# Patient Record
Sex: MALE | Race: WHITE | NOT HISPANIC OR LATINO | URBAN - METROPOLITAN AREA
[De-identification: names, ages, dates, MRNs, and addresses within clinical notes are randomized per-mention and may not be internally consistent; named-entity substitution may affect disease eponyms.]

---

## 2017-08-14 ENCOUNTER — EMERGENCY (EMERGENCY)
Facility: HOSPITAL | Age: 47
LOS: 1 days | Discharge: HOME | End: 2017-08-14
Attending: EMERGENCY MEDICINE | Admitting: EMERGENCY MEDICINE
Payer: COMMERCIAL

## 2017-08-14 VITALS
HEIGHT: 69 IN | HEART RATE: 99 BPM | TEMPERATURE: 98 F | RESPIRATION RATE: 16 BRPM | SYSTOLIC BLOOD PRESSURE: 164 MMHG | WEIGHT: 199.96 LBS | DIASTOLIC BLOOD PRESSURE: 90 MMHG | OXYGEN SATURATION: 98 %

## 2017-08-14 DIAGNOSIS — Z88.1 ALLERGY STATUS TO OTHER ANTIBIOTIC AGENTS STATUS: ICD-10-CM

## 2017-08-14 DIAGNOSIS — Z90.49 ACQUIRED ABSENCE OF OTHER SPECIFIED PARTS OF DIGESTIVE TRACT: Chronic | ICD-10-CM

## 2017-08-14 DIAGNOSIS — K21.9 GASTRO-ESOPHAGEAL REFLUX DISEASE WITHOUT ESOPHAGITIS: ICD-10-CM
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
## 2017-08-14 DIAGNOSIS — R51 HEADACHE: ICD-10-CM
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
## 2017-08-14 DIAGNOSIS — S02.2XXA FRACTURE OF NASAL BONES, INITIAL ENCOUNTER FOR CLOSED FRACTURE: ICD-10-CM

## 2017-08-14 DIAGNOSIS — I10 ESSENTIAL (PRIMARY) HYPERTENSION: ICD-10-CM

## 2017-08-14 PROCEDURE — 99285 EMERGENCY DEPT VISIT HI MDM: CPT | Mod: 25

## 2017-08-14 PROCEDURE — 70450 CT HEAD/BRAIN W/O DYE: CPT | Mod: 26

## 2017-08-14 PROCEDURE — 70450 CT HEAD/BRAIN W/O DYE: CPT

## 2017-08-14 PROCEDURE — 99284 EMERGENCY DEPT VISIT MOD MDM: CPT

## 2017-08-14 PROCEDURE — 72125 CT NECK SPINE W/O DYE: CPT

## 2017-08-14 PROCEDURE — 72125 CT NECK SPINE W/O DYE: CPT | Mod: 26

## 2017-08-14 PROCEDURE — 70486 CT MAXILLOFACIAL W/O DYE: CPT

## 2017-08-14 PROCEDURE — 21337 CLOSED TX SEPTAL&NOSE FX: CPT

## 2017-08-14 PROCEDURE — 70486 CT MAXILLOFACIAL W/O DYE: CPT | Mod: 26

## 2017-08-14 RX ORDER — AMLODIPINE BESYLATE 2.5 MG/1
0 TABLET ORAL
Qty: 0 | Refills: 0 | COMMUNITY

## 2017-08-14 RX ORDER — OXYCODONE AND ACETAMINOPHEN 5; 325 MG/1; MG/1
1 TABLET ORAL ONCE
Qty: 0 | Refills: 0 | Status: DISCONTINUED | OUTPATIENT
Start: 2017-08-14 | End: 2017-08-14

## 2017-08-14 RX ORDER — IBUPROFEN 200 MG
600 TABLET ORAL ONCE
Qty: 0 | Refills: 0 | Status: COMPLETED | OUTPATIENT
Start: 2017-08-14 | End: 2017-08-14

## 2017-08-14 RX ADMIN — Medication 600 MILLIGRAM(S): at 11:30

## 2017-08-14 RX ADMIN — OXYCODONE AND ACETAMINOPHEN 1 TABLET(S): 5; 325 TABLET ORAL at 15:10

## 2017-08-14 RX ADMIN — Medication 1 TABLET(S): at 15:10
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
## 2017-08-14 NOTE — ED PROVIDER NOTE - CARE PLAN
Principal Discharge DX:	Facial injury, initial encounter Principal Discharge DX:	Injury of head, initial encounter  Secondary Diagnosis:	Closed fracture of nasal bone, initial encounter

## 2017-08-16 ENCOUNTER — EMERGENCY (EMERGENCY)
Facility: HOSPITAL | Age: 47
LOS: 1 days | Discharge: HOME | End: 2017-08-16
Attending: EMERGENCY MEDICINE | Admitting: EMERGENCY MEDICINE
Payer: COMMERCIAL

## 2017-08-16 VITALS
HEART RATE: 128 BPM | DIASTOLIC BLOOD PRESSURE: 90 MMHG | SYSTOLIC BLOOD PRESSURE: 147 MMHG | WEIGHT: 198.42 LBS | RESPIRATION RATE: 19 BRPM | HEIGHT: 69 IN | TEMPERATURE: 99 F | OXYGEN SATURATION: 96 %

## 2017-08-16 DIAGNOSIS — Z90.49 ACQUIRED ABSENCE OF OTHER SPECIFIED PARTS OF DIGESTIVE TRACT: Chronic | ICD-10-CM

## 2017-08-16 DIAGNOSIS — Z88.1 ALLERGY STATUS TO OTHER ANTIBIOTIC AGENTS STATUS: ICD-10-CM

## 2017-08-16 DIAGNOSIS — Z79.2 LONG TERM (CURRENT) USE OF ANTIBIOTICS: ICD-10-CM

## 2017-08-16 DIAGNOSIS — R50.9 FEVER, UNSPECIFIED: ICD-10-CM

## 2017-08-16 DIAGNOSIS — K21.9 GASTRO-ESOPHAGEAL REFLUX DISEASE WITHOUT ESOPHAGITIS: ICD-10-CM

## 2017-08-16 DIAGNOSIS — Z79.891 LONG TERM (CURRENT) USE OF OPIATE ANALGESIC: ICD-10-CM

## 2017-08-16 DIAGNOSIS — I10 ESSENTIAL (PRIMARY) HYPERTENSION: ICD-10-CM

## 2017-08-16 DIAGNOSIS — J01.01 ACUTE RECURRENT MAXILLARY SINUSITIS: ICD-10-CM

## 2017-08-16 LAB
ALBUMIN SERPL ELPH-MCNC: 4.1 G/DL — SIGNIFICANT CHANGE UP (ref 3.3–5)
ALP SERPL-CCNC: 62 U/L — SIGNIFICANT CHANGE UP (ref 40–120)
ALT FLD-CCNC: 69 U/L — SIGNIFICANT CHANGE UP (ref 12–78)
ANION GAP SERPL CALC-SCNC: 8 MMOL/L — SIGNIFICANT CHANGE UP (ref 5–17)
AST SERPL-CCNC: 49 U/L — HIGH (ref 15–37)
BASOPHILS # BLD AUTO: 0 K/UL — SIGNIFICANT CHANGE UP (ref 0–0.2)
BASOPHILS NFR BLD AUTO: 0.3 % — SIGNIFICANT CHANGE UP (ref 0–2)
BILIRUB SERPL-MCNC: 1 MG/DL — SIGNIFICANT CHANGE UP (ref 0.2–1.2)
BUN SERPL-MCNC: 13 MG/DL — SIGNIFICANT CHANGE UP (ref 7–23)
CALCIUM SERPL-MCNC: 9.6 MG/DL — SIGNIFICANT CHANGE UP (ref 8.5–10.1)
CHLORIDE SERPL-SCNC: 101 MMOL/L — SIGNIFICANT CHANGE UP (ref 96–108)
CO2 SERPL-SCNC: 27 MMOL/L — SIGNIFICANT CHANGE UP (ref 22–31)
CREAT SERPL-MCNC: 1 MG/DL — SIGNIFICANT CHANGE UP (ref 0.5–1.3)
EOSINOPHIL # BLD AUTO: 0 K/UL — SIGNIFICANT CHANGE UP (ref 0–0.5)
EOSINOPHIL NFR BLD AUTO: 0.1 % — SIGNIFICANT CHANGE UP (ref 0–6)
GLUCOSE SERPL-MCNC: 123 MG/DL — HIGH (ref 70–99)
HCT VFR BLD CALC: 47.4 % — SIGNIFICANT CHANGE UP (ref 39–50)
HGB BLD-MCNC: 15.6 G/DL — SIGNIFICANT CHANGE UP (ref 13–17)
LACTATE SERPL-SCNC: 1.1 MMOL/L — SIGNIFICANT CHANGE UP (ref 0.7–2)
LYMPHOCYTES # BLD AUTO: 0.7 K/UL — LOW (ref 1–3.3)
LYMPHOCYTES # BLD AUTO: 5.2 % — LOW (ref 13–44)
MCHC RBC-ENTMCNC: 31.6 PG — SIGNIFICANT CHANGE UP (ref 27–34)
MCHC RBC-ENTMCNC: 33 GM/DL — SIGNIFICANT CHANGE UP (ref 32–36)
MCV RBC AUTO: 95.7 FL — SIGNIFICANT CHANGE UP (ref 80–100)
MONOCYTES # BLD AUTO: 0.8 K/UL — SIGNIFICANT CHANGE UP (ref 0–0.9)
MONOCYTES NFR BLD AUTO: 5.6 % — SIGNIFICANT CHANGE UP (ref 1–9)
NEUTROPHILS # BLD AUTO: 12 K/UL — HIGH (ref 1.8–7.4)
NEUTROPHILS NFR BLD AUTO: 88.8 % — HIGH (ref 43–77)
PLATELET # BLD AUTO: 234 K/UL — SIGNIFICANT CHANGE UP (ref 150–400)
POTASSIUM SERPL-MCNC: 4.3 MMOL/L — SIGNIFICANT CHANGE UP (ref 3.5–5.3)
POTASSIUM SERPL-SCNC: 4.3 MMOL/L — SIGNIFICANT CHANGE UP (ref 3.5–5.3)
PROCALCITONIN SERPL-MCNC: 0.14 NG/ML — HIGH (ref 0–0.04)
PROT SERPL-MCNC: 8 G/DL — SIGNIFICANT CHANGE UP (ref 6–8.3)
RBC # BLD: 4.95 M/UL — SIGNIFICANT CHANGE UP (ref 4.2–5.8)
RBC # FLD: 12.9 % — SIGNIFICANT CHANGE UP (ref 10.3–14.5)
SODIUM SERPL-SCNC: 136 MMOL/L — SIGNIFICANT CHANGE UP (ref 135–145)
WBC # BLD: 13.5 K/UL — HIGH (ref 3.8–10.5)
WBC # FLD AUTO: 13.5 K/UL — HIGH (ref 3.8–10.5)

## 2017-08-16 PROCEDURE — 87040 BLOOD CULTURE FOR BACTERIA: CPT

## 2017-08-16 PROCEDURE — 96374 THER/PROPH/DIAG INJ IV PUSH: CPT

## 2017-08-16 PROCEDURE — 70486 CT MAXILLOFACIAL W/O DYE: CPT

## 2017-08-16 PROCEDURE — 84145 PROCALCITONIN (PCT): CPT

## 2017-08-16 PROCEDURE — 99285 EMERGENCY DEPT VISIT HI MDM: CPT

## 2017-08-16 PROCEDURE — 85027 COMPLETE CBC AUTOMATED: CPT

## 2017-08-16 PROCEDURE — 80053 COMPREHEN METABOLIC PANEL: CPT

## 2017-08-16 PROCEDURE — 83605 ASSAY OF LACTIC ACID: CPT

## 2017-08-16 PROCEDURE — 99284 EMERGENCY DEPT VISIT MOD MDM: CPT | Mod: 25

## 2017-08-16 PROCEDURE — 36415 COLL VENOUS BLD VENIPUNCTURE: CPT

## 2017-08-16 PROCEDURE — 70486 CT MAXILLOFACIAL W/O DYE: CPT | Mod: 26

## 2017-08-16 RX ORDER — SODIUM CHLORIDE 9 MG/ML
1000 INJECTION INTRAMUSCULAR; INTRAVENOUS; SUBCUTANEOUS ONCE
Qty: 0 | Refills: 0 | Status: DISCONTINUED | OUTPATIENT
Start: 2017-08-16 | End: 2017-08-20

## 2017-08-16 RX ORDER — SODIUM CHLORIDE 9 MG/ML
1000 INJECTION INTRAMUSCULAR; INTRAVENOUS; SUBCUTANEOUS ONCE
Qty: 0 | Refills: 0 | Status: COMPLETED | OUTPATIENT
Start: 2017-08-16 | End: 2017-08-16

## 2017-08-16 RX ORDER — CIPROFLOXACIN LACTATE 400MG/40ML
1 VIAL (ML) INTRAVENOUS
Qty: 10 | Refills: 0 | OUTPATIENT
Start: 2017-08-16 | End: 2017-08-26

## 2017-08-16 RX ORDER — SODIUM CHLORIDE 9 MG/ML
3 INJECTION INTRAMUSCULAR; INTRAVENOUS; SUBCUTANEOUS ONCE
Qty: 0 | Refills: 0 | Status: COMPLETED | OUTPATIENT
Start: 2017-08-16 | End: 2017-08-16

## 2017-08-16 RX ADMIN — SODIUM CHLORIDE 3 MILLILITER(S): 9 INJECTION INTRAMUSCULAR; INTRAVENOUS; SUBCUTANEOUS at 13:19

## 2017-08-16 RX ADMIN — SODIUM CHLORIDE 2000 MILLILITER(S): 9 INJECTION INTRAMUSCULAR; INTRAVENOUS; SUBCUTANEOUS at 13:20

## 2017-08-16 NOTE — ED PROVIDER NOTE - MEDICAL DECISION MAKING DETAILS
Fever and chills post op nasal surgery few days ago requiring evaluation followed by labs, ct scan and IVFs and meds

## 2017-08-21 LAB
CULTURE RESULTS: SIGNIFICANT CHANGE UP
CULTURE RESULTS: SIGNIFICANT CHANGE UP
SPECIMEN SOURCE: SIGNIFICANT CHANGE UP
SPECIMEN SOURCE: SIGNIFICANT CHANGE UP
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
## 2023-06-03 ENCOUNTER — EMERGENCY (EMERGENCY)
Facility: HOSPITAL | Age: 53
LOS: 1 days | Discharge: HOME | End: 2023-06-03
Attending: EMERGENCY MEDICINE | Admitting: EMERGENCY MEDICINE
Payer: COMMERCIAL

## 2023-06-03 VITALS
DIASTOLIC BLOOD PRESSURE: 78 MMHG | RESPIRATION RATE: 17 BRPM | SYSTOLIC BLOOD PRESSURE: 124 MMHG | HEART RATE: 82 BPM | OXYGEN SATURATION: 100 % | TEMPERATURE: 98 F

## 2023-06-03 VITALS
HEART RATE: 97 BPM | SYSTOLIC BLOOD PRESSURE: 147 MMHG | RESPIRATION RATE: 18 BRPM | TEMPERATURE: 99 F | OXYGEN SATURATION: 94 % | DIASTOLIC BLOOD PRESSURE: 97 MMHG | WEIGHT: 220.02 LBS

## 2023-06-03 DIAGNOSIS — Z90.49 ACQUIRED ABSENCE OF OTHER SPECIFIED PARTS OF DIGESTIVE TRACT: Chronic | ICD-10-CM

## 2023-06-03 PROBLEM — I10 ESSENTIAL (PRIMARY) HYPERTENSION: Chronic | Status: ACTIVE | Noted: 2017-08-14

## 2023-06-03 PROBLEM — K21.9 GASTRO-ESOPHAGEAL REFLUX DISEASE WITHOUT ESOPHAGITIS: Chronic | Status: ACTIVE | Noted: 2017-08-14

## 2023-06-03 LAB
ALBUMIN SERPL ELPH-MCNC: 4 G/DL — SIGNIFICANT CHANGE UP (ref 3.3–5)
ALP SERPL-CCNC: 83 U/L — SIGNIFICANT CHANGE UP (ref 40–120)
ALT FLD-CCNC: 70 U/L — SIGNIFICANT CHANGE UP (ref 12–78)
ANION GAP SERPL CALC-SCNC: 4 MMOL/L — LOW (ref 5–17)
APTT BLD: 30.1 SEC — SIGNIFICANT CHANGE UP (ref 27.5–35.5)
AST SERPL-CCNC: 142 U/L — HIGH (ref 15–37)
BASOPHILS # BLD AUTO: 0.06 K/UL — SIGNIFICANT CHANGE UP (ref 0–0.2)
BASOPHILS NFR BLD AUTO: 1.1 % — SIGNIFICANT CHANGE UP (ref 0–2)
BILIRUB SERPL-MCNC: 0.7 MG/DL — SIGNIFICANT CHANGE UP (ref 0.2–1.2)
BUN SERPL-MCNC: 10 MG/DL — SIGNIFICANT CHANGE UP (ref 7–23)
CALCIUM SERPL-MCNC: 9.1 MG/DL — SIGNIFICANT CHANGE UP (ref 8.5–10.1)
CHLORIDE SERPL-SCNC: 108 MMOL/L — SIGNIFICANT CHANGE UP (ref 96–108)
CO2 SERPL-SCNC: 30 MMOL/L — SIGNIFICANT CHANGE UP (ref 22–31)
CREAT SERPL-MCNC: 0.89 MG/DL — SIGNIFICANT CHANGE UP (ref 0.5–1.3)
EGFR: 102 ML/MIN/1.73M2 — SIGNIFICANT CHANGE UP
EOSINOPHIL # BLD AUTO: 0.07 K/UL — SIGNIFICANT CHANGE UP (ref 0–0.5)
EOSINOPHIL NFR BLD AUTO: 1.3 % — SIGNIFICANT CHANGE UP (ref 0–6)
ETHANOL SERPL-MCNC: 377 MG/DL — HIGH (ref 0–10)
GLUCOSE SERPL-MCNC: 108 MG/DL — HIGH (ref 70–99)
HCT VFR BLD CALC: 48.3 % — SIGNIFICANT CHANGE UP (ref 39–50)
HGB BLD-MCNC: 16.1 G/DL — SIGNIFICANT CHANGE UP (ref 13–17)
IMM GRANULOCYTES NFR BLD AUTO: 0.4 % — SIGNIFICANT CHANGE UP (ref 0–0.9)
INR BLD: 0.95 RATIO — SIGNIFICANT CHANGE UP (ref 0.88–1.16)
LYMPHOCYTES # BLD AUTO: 2.43 K/UL — SIGNIFICANT CHANGE UP (ref 1–3.3)
LYMPHOCYTES # BLD AUTO: 45.9 % — HIGH (ref 13–44)
MCHC RBC-ENTMCNC: 32.7 PG — SIGNIFICANT CHANGE UP (ref 27–34)
MCHC RBC-ENTMCNC: 33.3 GM/DL — SIGNIFICANT CHANGE UP (ref 32–36)
MCV RBC AUTO: 98 FL — SIGNIFICANT CHANGE UP (ref 80–100)
MONOCYTES # BLD AUTO: 0.46 K/UL — SIGNIFICANT CHANGE UP (ref 0–0.9)
MONOCYTES NFR BLD AUTO: 8.7 % — SIGNIFICANT CHANGE UP (ref 2–14)
NEUTROPHILS # BLD AUTO: 2.25 K/UL — SIGNIFICANT CHANGE UP (ref 1.8–7.4)
NEUTROPHILS NFR BLD AUTO: 42.6 % — LOW (ref 43–77)
NRBC # BLD: 0 /100 WBCS — SIGNIFICANT CHANGE UP (ref 0–0)
PLATELET # BLD AUTO: 182 K/UL — SIGNIFICANT CHANGE UP (ref 150–400)
POTASSIUM SERPL-MCNC: 4.7 MMOL/L — SIGNIFICANT CHANGE UP (ref 3.5–5.3)
POTASSIUM SERPL-SCNC: 4.7 MMOL/L — SIGNIFICANT CHANGE UP (ref 3.5–5.3)
PROT SERPL-MCNC: 8.1 G/DL — SIGNIFICANT CHANGE UP (ref 6–8.3)
PROTHROM AB SERPL-ACNC: 11.1 SEC — SIGNIFICANT CHANGE UP (ref 10.5–13.4)
RBC # BLD: 4.93 M/UL — SIGNIFICANT CHANGE UP (ref 4.2–5.8)
RBC # FLD: 14.4 % — SIGNIFICANT CHANGE UP (ref 10.3–14.5)
SODIUM SERPL-SCNC: 142 MMOL/L — SIGNIFICANT CHANGE UP (ref 135–145)
TROPONIN I, HIGH SENSITIVITY RESULT: 41.9 NG/L — SIGNIFICANT CHANGE UP
WBC # BLD: 5.29 K/UL — SIGNIFICANT CHANGE UP (ref 3.8–10.5)
WBC # FLD AUTO: 5.29 K/UL — SIGNIFICANT CHANGE UP (ref 3.8–10.5)

## 2023-06-03 PROCEDURE — 93005 ELECTROCARDIOGRAM TRACING: CPT

## 2023-06-03 PROCEDURE — 85730 THROMBOPLASTIN TIME PARTIAL: CPT

## 2023-06-03 PROCEDURE — 70498 CT ANGIOGRAPHY NECK: CPT | Mod: MA

## 2023-06-03 PROCEDURE — 80053 COMPREHEN METABOLIC PANEL: CPT

## 2023-06-03 PROCEDURE — 36415 COLL VENOUS BLD VENIPUNCTURE: CPT

## 2023-06-03 PROCEDURE — 99285 EMERGENCY DEPT VISIT HI MDM: CPT

## 2023-06-03 PROCEDURE — 70496 CT ANGIOGRAPHY HEAD: CPT | Mod: 26,MA

## 2023-06-03 PROCEDURE — 99285 EMERGENCY DEPT VISIT HI MDM: CPT | Mod: 25

## 2023-06-03 PROCEDURE — 85025 COMPLETE CBC W/AUTO DIFF WBC: CPT

## 2023-06-03 PROCEDURE — 85610 PROTHROMBIN TIME: CPT

## 2023-06-03 PROCEDURE — 70498 CT ANGIOGRAPHY NECK: CPT | Mod: 26,MA

## 2023-06-03 PROCEDURE — 80307 DRUG TEST PRSMV CHEM ANLYZR: CPT

## 2023-06-03 PROCEDURE — 71045 X-RAY EXAM CHEST 1 VIEW: CPT

## 2023-06-03 PROCEDURE — 93010 ELECTROCARDIOGRAM REPORT: CPT

## 2023-06-03 PROCEDURE — 84484 ASSAY OF TROPONIN QUANT: CPT

## 2023-06-03 PROCEDURE — 70450 CT HEAD/BRAIN W/O DYE: CPT | Mod: MA

## 2023-06-03 PROCEDURE — 82962 GLUCOSE BLOOD TEST: CPT

## 2023-06-03 PROCEDURE — 70496 CT ANGIOGRAPHY HEAD: CPT | Mod: MA

## 2023-06-03 PROCEDURE — 71045 X-RAY EXAM CHEST 1 VIEW: CPT | Mod: 26

## 2023-06-03 RX ORDER — ALBUTEROL 90 UG/1
2.5 AEROSOL, METERED ORAL
Refills: 0 | Status: DISCONTINUED | OUTPATIENT
Start: 2023-06-03 | End: 2023-06-03

## 2023-06-03 RX ORDER — SODIUM CHLORIDE 9 MG/ML
1000 INJECTION INTRAMUSCULAR; INTRAVENOUS; SUBCUTANEOUS ONCE
Refills: 0 | Status: COMPLETED | OUTPATIENT
Start: 2023-06-03 | End: 2023-06-03

## 2023-06-03 RX ORDER — ONDANSETRON 8 MG/1
1 TABLET, FILM COATED ORAL
Qty: 9 | Refills: 0
Start: 2023-06-03 | End: 2023-06-05

## 2023-06-03 NOTE — ED PROVIDER NOTE - CLINICAL SUMMARY MEDICAL DECISION MAKING FREE TEXT BOX
Patient is a 52-year-old white male with history of hemochromatosis as well as alcohol abuse here now for evaluation of feeling slightly confused.  No substance use.  No recent falls.  No head injury.  No recent illnesses.  No fever no chills nausea vomiting diarrhea.  No abdominal pain.  This feeling of feeling off and somewhat confused only since this morning.  This case will require thorough evaluation as well as labs and imaging.

## 2023-06-03 NOTE — ED PROVIDER NOTE - OBJECTIVE STATEMENT
52 M c/o feeling confused, off balance, had vomiting and diarrhea this morning. States symptoms have been going on for months but felt worse this morning so came in. Denies head injury, cp, sob, numbness, weakness. Denies drug use. States drinks alcohol a few times a week.

## 2023-06-03 NOTE — ED PROVIDER NOTE - PATIENT PORTAL LINK FT
You can access the FollowMyHealth Patient Portal offered by [facility] by registering at the following website: [email or web address]. By joining [employer]’s FollowMyHealth portal, you will also be able to view your health information using other applications (apps) compatible with our system.

## 2023-06-03 NOTE — ED PROVIDER NOTE - PROGRESS NOTE DETAILS
ambulating with steady gait, requesting to go home, offered for him to stay and talk to sw about rehab program, he says "the only place I want to go right now is home", wife at bedside had length discussion with pt and wife about results, copied provided, discussed options  - pt wants to go home, has doctors to follow with including GI - requesting antinausea medication - advised can return at any time, strict return precautions discussed

## 2023-06-03 NOTE — ED PROVIDER NOTE - ATTENDING APP SHARED VISIT CONTRIBUTION OF CARE
Examination reveals a well-developed well-nourished white male with slight dysarthria no acute distress with clear lungs normal heart sounds benign nontender abdomen and neurologic evaluation that is completely nonfocal other than slight dysarthria.  I agree with plan of management outlined by PA.

## 2023-06-03 NOTE — ED PROVIDER NOTE - CARE PROVIDER_API CALL
Abdias Myrick  Internal Medicine  [address] [place] [address]  Phone: [phone]  Fax: [phone]  Follow Up Time:

## 2023-06-03 NOTE — ED ADULT NURSE NOTE - NSFALLUNIVINTERV_ED_ALL_ED
Bed/Stretcher in lowest position, wheels locked, appropriate side rails in place/Call bell, personal items and telephone in reach/Instruct patient to call for assistance before getting out of bed/chair/stretcher/Non-slip footwear applied when patient is off stretcher/[place] to call system/Physically safe environment - no spills, clutter or unnecessary equipment/Purposeful proactive rounding/Room/bathroom lighting operational, light cord in reach

## 2023-06-03 NOTE — ED PROVIDER NOTE - NSFOLLOWUPCLINICS_GEN_ALL_ED_FT
Alcoholics Anonymous - 24 hour hotline  Alcoholics Anonymous  .  [place]   Phone: [phone]  Fax:     [facility] Dept of Substance Abuse  Psychiatry Substance Abuse  [address] [place] [id number]  Phone: [phone]  Fax:

## 2023-06-03 NOTE — ED ADULT NURSE NOTE - OBJECTIVE STATEMENT
52y male here in ED with wife at bedside, w/ concerns of not being able to walk straight, intermittent periods of confusion, nose bleeds, and mild SOB with exertion for about 1 month now. Pt has a pmhx of HTN but stopped taking his medications years ago but he didn't like how the meds made him feel. Pt has not followed up with any doctors or his PCP in over 2 years. Pt denies drinking alcohol every morning but today morning had a "shot of beer." Pt flushed in appearance, no hand tremors or tongue fasciculations. Denies drug use. Currently denies chest pain/SOB/dizziness/abdominal pain.

## 2023-06-03 NOTE — ED PROVIDER NOTE - CARE PLAN
1 Principal Discharge DX:	Alcoholic intoxication   Principal Discharge DX:	Confusion  Secondary Diagnosis:	Alcohol intoxication

## 2023-06-03 NOTE — ED PROVIDER NOTE - NSFOLLOWUPINSTRUCTIONS_ED_ALL_ED_FT
Alcohol intoxication happens when you cannot think clearly or function well after drinking alcohol. This can happen after just one drink. The effect that alcohol has on how you think and function depends on:  How much alcohol you drank.  Your age, your weight, and whether you are a man or a woman.  How often you drink alcohol.  If you have other medical problems.  Alcohol intoxication can range from mild to severe. It can be dangerous, especially if:  You drink a large amount of alcohol in a short time (binge drink).  You drink alcohol and take drugs or medicines.  What are the causes?  This condition is caused by drinking alcohol.    What increases the risk?  Peer pressure in young adults.  Difficulty managing stress.  History of drug or alcohol abuse.  Drinking alcohol and taking drugs.  Family history of drug or alcohol abuse.  Low body weight.  Binge drinking.  What are the signs or symptoms?  Feeling relaxed or sleepy.  Having mild difficulty with coordination, speech, memory, or attention.  Strong anger or extreme sadness.  Severe difficulty with coordination, speech, memory, or attention.  Other symptoms may include:  Passing out.  Vomiting.  Confusion.  Slow breathing.  Coma.  How is this treated?  This condition may be treated with:  Close monitoring in the hospital.  IV fluids to add water in your body.  Medicine to treat vomiting or to get rid of alcohol in the body.  Counseling about the dangers of alcohol.  Oxygen therapy or a breathing machine (ventilator).  You may also be treated for substance use disorder.    Follow these instructions at home:  Eating and drinking    A 12-ounce bottle of beer, a 5-ounce glass of wine, and a 1.5-ounce shot of hard liquor.  Do not drink alcohol if:  Your doctor tells you not to drink.  You are pregnant, may be pregnant, or are planning to get pregnant.  You are under the legal drinking age (21 years old in the [place]  You are taking medicines that you should not take with alcohol.  Alcohol causes your medical problem to get worse.  You have to drive or do activities that need you to be alert.  You have substance use disorder. This is when using alcohol again and again causes problems with your health, your relationships, or with what you need to do at work, home, or school.  Ask your doctor if alcohol is safe for you. If you are allowed to drink, limit how much you have to:  0–1 drink a day for women who are not pregnant.  0–2 drinks a day for men.  Know how much alcohol is in your drink. In the [place] one drink equals one 12 oz bottle of beer (355 mL), one 5 oz glass of wine (148 mL), or one 1½ oz glass of hard liquor (44 mL).  Be sure to eat before you drink alcohol.  Alcohol increases peeing. It is important to stay hydrated and avoid caffeine.  Caffeine may be in coffee, tea, and some sodas.  Caffeine can make you thirsty.  Do not drink more than one drink in an hour.  If you are having more than one drink, have a drink without alcohol (such as water) between your drinks.  General instructions    A sign showing that a person should not drive.  Take over-the-counter and prescription medicines only as told by your doctor.  Do not drive after drinking any amount of alcohol. Plan for a designated  or another way to go home.  Have someone you trust stay with you while you are intoxicated. Youshould not be left alone.  Contact a doctor if:  You do not feel better after a few days.  You have problems at work, at school, or at home due to drinking.  You have trouble with any of the following:  Movement.  Talking.  Memory.  Paying attention to things.  Get help right away if:  You have trouble staying awake.  You are light-headed or faint.  You feel very confused.  You have trouble staying awake.  You are vomiting blood. The blood may be bright red or look like coffee grounds.  You have been told that you have had jerky movements that you cannot control (seizure).  You have blood in your poop (stool). The blood may:  Be bright red.  Make your poop black and tarry and make it smell bad.  These symptoms may be an emergency. Get help right away. Call 911.  Do not wait to see if the symptoms will go away.  Do not drive yourself to the hospital.  Also, get help right away if:  You have thoughts about hurting yourself or others.  Take one of these steps if you feel like you may hurt yourself or others, or have thoughts about taking your own life:  Call 911.  Call the National Suicide Prevention Lifeline at 1-342.843.5087 or 358. This is open 24 hours a day.  Text the Crisis Text Line at 248720.  Summary  Alcohol intoxication happens when you cannot think clearly or function well after drinking alcohol. This can happen after just one drink.  If your doctor says that alcohol is safe for you, limit how much you drink.  Contact a doctor if you have problems at work, at school, or at home due to drinking.  Get help right away if you have thoughts about hurting yourself or others.  This information is not intended to replace advice given to you by your health care provider. Make sure you discuss any questions you have with your health care provider. Alcohol misuse is any pattern of alcohol consumption that harms your health, relationships, or work. Alcohol misuse can cause poor nutrition (malnutrition or malnourishment) and a lack of nutrients (nutrient deficiencies), which can lead to more health problems.    If you think that you have an alcohol dependency problem, or if it is hard to stop drinking because you feel sick or different when you do not use alcohol, talk with your health care provider or another health professional about where to get help.    Nutrition is an essential factor in recovering from alcohol misuse. Your health care provider or diet and nutrition specialist (dietitian) will work with you to design a plan that can help to restore nutrients to your body and prevent the risk of complications.    How does alcohol misuse affect my nutrition?  Alcohol misuse brings malnutrition and nutrient deficiencies in two ways:  It causes your liver to work abnormally. This affects how your body divides (breaks down) and absorbs nutrients from food.  It causes you to eat poorly. Many people who drink alcohol do not eat enough carbohydrates, protein, fat, vitamins, and minerals.  Nutrients that are commonly lacking in people who drink alcohol include:  Vitamins.  Vitamin A. This is needed for your vision, metabolism, and ability to fight off infections (immunity).  B vitamins. These include folate, thiamine, and niacin. These are needed for new cell growth.  Vitamin C. This plays an important role in wound healing, immunity, and helping your body to absorb iron.  Vitamin D. This is necessary for your body to absorb and use calcium. It is produced by your liver, but you can also get it from food and from sun exposure.  Minerals.  Calcium. This is needed for healthy bones as well as heart and blood vessel (cardiovascular) function.  Iron. This is important for blood, muscle, and nervous system functioning.  Magnesium. This plays an important role in muscle and nerve function, and it helps to control blood sugar and blood pressure.  Zinc. This is important for the normal functioning of your nervous system and digestive system (gastrointestinal tract).  What is my nutrition plan?  A plate with examples of foods in a healthy diet.  Your dietitian may develop a specific eating plan that is based on your condition and any other problems that you have. An eating plan will commonly include:  A balanced diet.  Grains: 6–8 "ounce-equivalents" a day. Examples of an ounce-equivalent of whole grains include 1 cup (60 g) of whole-wheat cereal, ½ cup (79 g) of brown rice, or 1 slice of whole-wheat bread.  Vegetables: 2–3 cups a day. Examples of 1 cup of vegetables include 2 medium carrots, 1 large tomato, or 2 stalks of celery.  Fruits: 1–2 cups a day. Examples of 1 cup of fruit include 1 large banana, 1 small apple, 8 large strawberries, or 1 large orange.  Meat and other protein: 5–6 oz (142–170 g) a day.  Foods that provide 1 oz of protein include 1 egg, ½ cup (28 g) of nuts or seeds, or 1 tablespoon (16 g) of peanut butter.  Dairy: 2–3 cups a day. Examples of 1 cup of dairy include 8 oz (230 mL) of milk, 8 oz (230 g) of yogurt, or 1½ oz (44 g) of natural cheese.  Vitamin and mineral supplements.  What are tips for following this plan?  Eat frequent meals and snacks. Try to eat 5–6 small meals each day.  Take vitamin or mineral supplements as recommended by your dietitian.  If you are malnourished or if your dietitian recommends it:  You may follow a high-protein, high-calorie diet. This may include:  2,000–3,000 calories (kilocalories) a day.  70–100 g (grams) of protein a day.  You may be directed to follow a diet that includes a complete nutritional supplement drink. This can help to restore calories, protein, and vitamins to your body. Depending on your condition, you may be advised to consume this drink instead of your meals or in addition to your meals.  What foods should I eat?  Eat foods that are high in molecules that prevent oxygen from reacting with your food (antioxidants). These foods include grapes, berries, nuts, green tea, and dark green or orange vegetables. Eating these can help to prevent some of the stress that is placed on your liver by consuming alcohol.  Eat a variety of fresh fruits and vegetables each day. This will help you to get fiber and vitamins in your diet.  Drink plenty of water and other clear fluids, such as apple juice and broth. Try to drink at least 48–64 oz (1.5–2 L) of water a day.  Include foods fortified with vitamins and minerals in your diet. Commonly fortified foods include milk, orange juice, cereal, and bread.  Eat a variety of foods that are high in omega-6 fatty acids. These include fish, nuts and seeds, and soybeans. These foods may help your liver to recover and may also stabilize your mood.  If you are a vegetarian:  Eat a variety of protein-rich foods.  Pair whole grains with plant-based proteins at meals and snack time. For example, eat rice with beans, put peanut butter on whole-grain toast, or eat oatmeal with sunflower seeds.  The items listed above may not be a complete list of foods and beverages you can eat. Contact a dietitian for more information.    What foods should I avoid?  Avoid foods and drinks that are high in fat and sugar. Sugary drinks, salty snacks, and candy contain empty calories. This means that they lack important nutrients such as protein, fiber, and vitamins.  Avoid alcohol. This is the best way to avoid malnutrition due to alcohol misuse.  If you must drink, drink measured amounts. Measured drinking means limiting your intake to no more than 1 drink a day for women who are not pregnant and 2 drinks a day for men.  One drink equals 12 oz (355 mL) of beer, 5 oz (148 mL) of wine, or 1½ oz (44 mL) of hard liquor.  Limit your intake of caffeine. Replace drinks like coffee and black tea with decaffeinated coffee and decaffeinated herbal tea.  The items listed above may not be a complete list of foods and beverages you should avoid. Contact a dietitian for more information.    Summary  Alcohol misuse can cause poor nutrition and a lack of nutrients, which can lead to more health problems.  Common nutrient deficiencies include vitamin deficiencies (A, B, C, and D) and mineral deficiencies (calcium, iron, magnesium, and zinc).  Nutrition is an essential factor in the therapy for recovery from alcohol misuse.  Your health care provider and dietitian can help you to develop a specific eating plan that includes a balanced diet plus vitamin and mineral supplements.  This information is not intended to replace advice given to you by your health care provider. Make sure you discuss any questions you have with your health care provider.

## 2023-06-04 RX ORDER — ONDANSETRON 8 MG/1
1 TABLET, FILM COATED ORAL
Qty: 9 | Refills: 0
Start: 2023-06-04 | End: 2023-06-06

## 2024-03-14 ENCOUNTER — OFFICE (OUTPATIENT)
Dept: URBAN - METROPOLITAN AREA CLINIC 104 | Facility: CLINIC | Age: 54
Setting detail: OPHTHALMOLOGY
End: 2024-03-14
Payer: COMMERCIAL

## 2024-03-14 DIAGNOSIS — H25.13: ICD-10-CM

## 2024-03-14 DIAGNOSIS — H25.12: ICD-10-CM

## 2024-03-14 DIAGNOSIS — H43.812: ICD-10-CM

## 2024-03-14 PROBLEM — H25.11 CATARACT SENILE NUCLEAR SCLEROSIS; RIGHT EYE, LEFT EYE, BOTH EYES: Status: ACTIVE | Noted: 2024-03-14

## 2024-03-14 PROCEDURE — 99204 OFFICE O/P NEW MOD 45 MIN: CPT | Performed by: SPECIALIST

## 2024-03-14 PROCEDURE — 92136 OPHTHALMIC BIOMETRY: CPT | Mod: LT | Performed by: SPECIALIST

## 2024-03-14 ASSESSMENT — REFRACTION_MANIFEST
OS_ADD: +2.50
OD_VA1: 20/30
OS_AXIS: 085
OD_AXIS: 170
OD_ADD: +2.25
OS_SPHERE: +0.50
OD_SPHERE: +1.25
OD_CYLINDER: -0.50
OS_VA1: 20/30
OS_CYLINDER: -0.50

## 2024-03-14 ASSESSMENT — SPHEQUIV_DERIVED
OS_SPHEQUIV: 0.25
OD_SPHEQUIV: 1

## 2024-03-14 ASSESSMENT — REFRACTION_CURRENTRX
OD_OVR_VA: 20/
OD_SPHERE: +3.00
OS_SPHERE: +3.00
OS_OVR_VA: 20/

## 2024-04-25 ENCOUNTER — RX ONLY (RX ONLY)
Age: 54
End: 2024-04-25

## 2024-04-26 ENCOUNTER — ASC (OUTPATIENT)
Dept: URBAN - METROPOLITAN AREA SURGERY 8 | Facility: SURGERY | Age: 54
Setting detail: OPHTHALMOLOGY
End: 2024-04-26
Payer: COMMERCIAL

## 2024-04-26 DIAGNOSIS — H25.12: ICD-10-CM

## 2024-04-26 PROCEDURE — 66984 XCAPSL CTRC RMVL W/O ECP: CPT | Mod: LT | Performed by: SPECIALIST

## 2024-04-27 ENCOUNTER — OFFICE (OUTPATIENT)
Dept: URBAN - METROPOLITAN AREA CLINIC 104 | Facility: CLINIC | Age: 54
Setting detail: OPHTHALMOLOGY
End: 2024-04-27
Payer: COMMERCIAL

## 2024-04-27 DIAGNOSIS — Z96.1: ICD-10-CM

## 2024-04-27 PROCEDURE — 99024 POSTOP FOLLOW-UP VISIT: CPT | Performed by: OPTOMETRIST

## 2024-05-02 ENCOUNTER — OFFICE (OUTPATIENT)
Dept: URBAN - METROPOLITAN AREA CLINIC 104 | Facility: CLINIC | Age: 54
Setting detail: OPHTHALMOLOGY
End: 2024-05-02
Payer: COMMERCIAL

## 2024-05-02 DIAGNOSIS — H25.11: ICD-10-CM

## 2024-05-02 DIAGNOSIS — Z96.1: ICD-10-CM

## 2024-05-02 PROCEDURE — 99024 POSTOP FOLLOW-UP VISIT: CPT | Performed by: OPTOMETRIST

## 2024-05-02 PROCEDURE — 92136 OPHTHALMIC BIOMETRY: CPT | Mod: RT | Performed by: SPECIALIST

## 2024-05-02 ASSESSMENT — CONFRONTATIONAL VISUAL FIELD TEST (CVF)
OS_FINDINGS: FULL
OD_FINDINGS: FULL

## 2024-05-10 ENCOUNTER — RX ONLY (RX ONLY)
Age: 54
End: 2024-05-10

## 2024-05-10 ENCOUNTER — ASC (OUTPATIENT)
Dept: URBAN - METROPOLITAN AREA SURGERY 8 | Facility: SURGERY | Age: 54
Setting detail: OPHTHALMOLOGY
End: 2024-05-10
Payer: COMMERCIAL

## 2024-05-10 DIAGNOSIS — H25.11: ICD-10-CM

## 2024-05-10 PROCEDURE — 66984 XCAPSL CTRC RMVL W/O ECP: CPT | Mod: 79,RT | Performed by: SPECIALIST

## 2024-05-11 ENCOUNTER — OFFICE (OUTPATIENT)
Dept: URBAN - METROPOLITAN AREA CLINIC 104 | Facility: CLINIC | Age: 54
Setting detail: OPHTHALMOLOGY
End: 2024-05-11
Payer: COMMERCIAL

## 2024-05-11 DIAGNOSIS — Z96.1: ICD-10-CM

## 2024-05-11 PROCEDURE — 99024 POSTOP FOLLOW-UP VISIT: CPT | Performed by: OPTOMETRIST

## 2024-05-11 ASSESSMENT — CONFRONTATIONAL VISUAL FIELD TEST (CVF)
OD_FINDINGS: FULL
OS_FINDINGS: FULL

## 2024-05-17 ENCOUNTER — OFFICE (OUTPATIENT)
Dept: URBAN - METROPOLITAN AREA CLINIC 104 | Facility: CLINIC | Age: 54
Setting detail: OPHTHALMOLOGY
End: 2024-05-17
Payer: COMMERCIAL

## 2024-05-17 DIAGNOSIS — Z96.1: ICD-10-CM

## 2024-05-17 PROCEDURE — 99024 POSTOP FOLLOW-UP VISIT: CPT | Performed by: OPTOMETRIST

## 2024-05-17 ASSESSMENT — CONFRONTATIONAL VISUAL FIELD TEST (CVF)
OS_FINDINGS: FULL
OD_FINDINGS: FULL

## 2024-06-20 ENCOUNTER — OFFICE (OUTPATIENT)
Dept: URBAN - METROPOLITAN AREA CLINIC 104 | Facility: CLINIC | Age: 54
Setting detail: OPHTHALMOLOGY
End: 2024-06-20
Payer: COMMERCIAL

## 2024-06-20 DIAGNOSIS — Z96.1: ICD-10-CM

## 2024-06-20 PROCEDURE — 99024 POSTOP FOLLOW-UP VISIT: CPT | Performed by: OPTOMETRIST

## 2024-06-20 ASSESSMENT — CONFRONTATIONAL VISUAL FIELD TEST (CVF)
OS_FINDINGS: FULL
OD_FINDINGS: FULL

## 2024-08-04 ENCOUNTER — INPATIENT (INPATIENT)
Facility: HOSPITAL | Age: 54
LOS: 0 days | Discharge: LEFT BEFORE DISCHARGE | DRG: 310 | End: 2024-08-05
Attending: HOSPITALIST | Admitting: INTERNAL MEDICINE
Payer: COMMERCIAL

## 2024-08-04 VITALS
WEIGHT: 220.02 LBS | HEART RATE: 122 BPM | DIASTOLIC BLOOD PRESSURE: 86 MMHG | TEMPERATURE: 101 F | RESPIRATION RATE: 20 BRPM | OXYGEN SATURATION: 96 % | SYSTOLIC BLOOD PRESSURE: 157 MMHG | HEIGHT: 69 IN

## 2024-08-04 DIAGNOSIS — R00.0 TACHYCARDIA, UNSPECIFIED: ICD-10-CM

## 2024-08-04 DIAGNOSIS — Z29.9 ENCOUNTER FOR PROPHYLACTIC MEASURES, UNSPECIFIED: ICD-10-CM

## 2024-08-04 DIAGNOSIS — R65.10 SYSTEMIC INFLAMMATORY RESPONSE SYNDROME (SIRS) OF NON-INFECTIOUS ORIGIN WITHOUT ACUTE ORGAN DYSFUNCTION: ICD-10-CM

## 2024-08-04 DIAGNOSIS — Z90.49 ACQUIRED ABSENCE OF OTHER SPECIFIED PARTS OF DIGESTIVE TRACT: Chronic | ICD-10-CM

## 2024-08-04 DIAGNOSIS — K21.9 GASTRO-ESOPHAGEAL REFLUX DISEASE WITHOUT ESOPHAGITIS: ICD-10-CM

## 2024-08-04 DIAGNOSIS — Z78.9 OTHER SPECIFIED HEALTH STATUS: ICD-10-CM

## 2024-08-04 DIAGNOSIS — I10 ESSENTIAL (PRIMARY) HYPERTENSION: ICD-10-CM

## 2024-08-04 DIAGNOSIS — T14.8XXA OTHER INJURY OF UNSPECIFIED BODY REGION, INITIAL ENCOUNTER: ICD-10-CM

## 2024-08-04 LAB
ALBUMIN SERPL ELPH-MCNC: 3.8 G/DL — SIGNIFICANT CHANGE UP (ref 3.3–5)
ALP SERPL-CCNC: 86 U/L — SIGNIFICANT CHANGE UP (ref 40–120)
ALT FLD-CCNC: 53 U/L — SIGNIFICANT CHANGE UP (ref 12–78)
ANION GAP SERPL CALC-SCNC: 10 MMOL/L — SIGNIFICANT CHANGE UP (ref 5–17)
APPEARANCE UR: CLEAR — SIGNIFICANT CHANGE UP
APTT BLD: 32.4 SEC — SIGNIFICANT CHANGE UP (ref 24.5–35.6)
AST SERPL-CCNC: 76 U/L — HIGH (ref 15–37)
BASOPHILS # BLD AUTO: 0.04 K/UL — SIGNIFICANT CHANGE UP (ref 0–0.2)
BASOPHILS NFR BLD AUTO: 0.8 % — SIGNIFICANT CHANGE UP (ref 0–2)
BILIRUB SERPL-MCNC: 1.2 MG/DL — SIGNIFICANT CHANGE UP (ref 0.2–1.2)
BILIRUB UR-MCNC: NEGATIVE — SIGNIFICANT CHANGE UP
BUN SERPL-MCNC: 8 MG/DL — SIGNIFICANT CHANGE UP (ref 7–23)
CALCIUM SERPL-MCNC: 9.3 MG/DL — SIGNIFICANT CHANGE UP (ref 8.5–10.1)
CHLORIDE SERPL-SCNC: 107 MMOL/L — SIGNIFICANT CHANGE UP (ref 96–108)
CO2 SERPL-SCNC: 26 MMOL/L — SIGNIFICANT CHANGE UP (ref 22–31)
COLOR SPEC: YELLOW — SIGNIFICANT CHANGE UP
CREAT SERPL-MCNC: 0.88 MG/DL — SIGNIFICANT CHANGE UP (ref 0.5–1.3)
DIFF PNL FLD: NEGATIVE — SIGNIFICANT CHANGE UP
EGFR: 101 ML/MIN/1.73M2 — SIGNIFICANT CHANGE UP
EOSINOPHIL # BLD AUTO: 0.04 K/UL — SIGNIFICANT CHANGE UP (ref 0–0.5)
EOSINOPHIL NFR BLD AUTO: 0.8 % — SIGNIFICANT CHANGE UP (ref 0–6)
ETHANOL SERPL-MCNC: 270 MG/DL — HIGH (ref 0–10)
FLUAV AG NPH QL: SIGNIFICANT CHANGE UP
FLUBV AG NPH QL: SIGNIFICANT CHANGE UP
GLUCOSE SERPL-MCNC: 133 MG/DL — HIGH (ref 70–99)
GLUCOSE UR QL: NEGATIVE MG/DL — SIGNIFICANT CHANGE UP
HCT VFR BLD CALC: 43.8 % — SIGNIFICANT CHANGE UP (ref 39–50)
HGB BLD-MCNC: 14.8 G/DL — SIGNIFICANT CHANGE UP (ref 13–17)
IMM GRANULOCYTES NFR BLD AUTO: 0.2 % — SIGNIFICANT CHANGE UP (ref 0–0.9)
INR BLD: 0.97 RATIO — SIGNIFICANT CHANGE UP (ref 0.85–1.18)
KETONES UR-MCNC: NEGATIVE MG/DL — SIGNIFICANT CHANGE UP
LACTATE SERPL-SCNC: 2.2 MMOL/L — HIGH (ref 0.7–2)
LACTATE SERPL-SCNC: 2.4 MMOL/L — HIGH (ref 0.7–2)
LEUKOCYTE ESTERASE UR-ACNC: NEGATIVE — SIGNIFICANT CHANGE UP
LYMPHOCYTES # BLD AUTO: 2.2 K/UL — SIGNIFICANT CHANGE UP (ref 1–3.3)
LYMPHOCYTES # BLD AUTO: 45.4 % — HIGH (ref 13–44)
MCHC RBC-ENTMCNC: 31.1 PG — SIGNIFICANT CHANGE UP (ref 27–34)
MCHC RBC-ENTMCNC: 33.8 GM/DL — SIGNIFICANT CHANGE UP (ref 32–36)
MCV RBC AUTO: 92 FL — SIGNIFICANT CHANGE UP (ref 80–100)
MONOCYTES # BLD AUTO: 0.42 K/UL — SIGNIFICANT CHANGE UP (ref 0–0.9)
MONOCYTES NFR BLD AUTO: 8.7 % — SIGNIFICANT CHANGE UP (ref 2–14)
NEUTROPHILS # BLD AUTO: 2.14 K/UL — SIGNIFICANT CHANGE UP (ref 1.8–7.4)
NEUTROPHILS NFR BLD AUTO: 44.1 % — SIGNIFICANT CHANGE UP (ref 43–77)
NITRITE UR-MCNC: NEGATIVE — SIGNIFICANT CHANGE UP
NRBC # BLD: 0 /100 WBCS — SIGNIFICANT CHANGE UP (ref 0–0)
PH UR: 5.5 — SIGNIFICANT CHANGE UP (ref 5–8)
PLATELET # BLD AUTO: 142 K/UL — LOW (ref 150–400)
POTASSIUM SERPL-MCNC: 3.4 MMOL/L — LOW (ref 3.5–5.3)
POTASSIUM SERPL-SCNC: 3.4 MMOL/L — LOW (ref 3.5–5.3)
PROT SERPL-MCNC: 8.1 G/DL — SIGNIFICANT CHANGE UP (ref 6–8.3)
PROT UR-MCNC: 30 MG/DL
PROTHROM AB SERPL-ACNC: 11.4 SEC — SIGNIFICANT CHANGE UP (ref 9.5–13)
RBC # BLD: 4.76 M/UL — SIGNIFICANT CHANGE UP (ref 4.2–5.8)
RBC # FLD: 13.7 % — SIGNIFICANT CHANGE UP (ref 10.3–14.5)
RSV RNA NPH QL NAA+NON-PROBE: SIGNIFICANT CHANGE UP
SARS-COV-2 RNA SPEC QL NAA+PROBE: SIGNIFICANT CHANGE UP
SODIUM SERPL-SCNC: 143 MMOL/L — SIGNIFICANT CHANGE UP (ref 135–145)
SP GR SPEC: 1.03 — SIGNIFICANT CHANGE UP (ref 1–1.03)
TROPONIN I, HIGH SENSITIVITY RESULT: 96.8 NG/L — HIGH
UROBILINOGEN FLD QL: 0.2 MG/DL — SIGNIFICANT CHANGE UP (ref 0.2–1)
WBC # BLD: 4.85 K/UL — SIGNIFICANT CHANGE UP (ref 3.8–10.5)
WBC # FLD AUTO: 4.85 K/UL — SIGNIFICANT CHANGE UP (ref 3.8–10.5)

## 2024-08-04 PROCEDURE — 74177 CT ABD & PELVIS W/CONTRAST: CPT | Mod: 26,MC

## 2024-08-04 PROCEDURE — 70486 CT MAXILLOFACIAL W/O DYE: CPT | Mod: 26,MC

## 2024-08-04 PROCEDURE — 93010 ELECTROCARDIOGRAM REPORT: CPT | Mod: 76

## 2024-08-04 PROCEDURE — 71275 CT ANGIOGRAPHY CHEST: CPT | Mod: 26,MC

## 2024-08-04 PROCEDURE — 99285 EMERGENCY DEPT VISIT HI MDM: CPT

## 2024-08-04 PROCEDURE — 70450 CT HEAD/BRAIN W/O DYE: CPT | Mod: 26,MC

## 2024-08-04 PROCEDURE — 72125 CT NECK SPINE W/O DYE: CPT | Mod: 26,MC

## 2024-08-04 RX ORDER — ONDANSETRON HCL/PF 4 MG/2 ML
4 VIAL (ML) INJECTION EVERY 8 HOURS
Refills: 0 | Status: DISCONTINUED | OUTPATIENT
Start: 2024-08-04 | End: 2024-08-04

## 2024-08-04 RX ORDER — LORAZEPAM 1 MG/1
1 TABLET ORAL ONCE
Refills: 0 | Status: DISCONTINUED | OUTPATIENT
Start: 2024-08-04 | End: 2024-08-04

## 2024-08-04 RX ORDER — POTASSIUM CHLORIDE 1500 MG/1
20 TABLET, EXTENDED RELEASE ORAL ONCE
Refills: 0 | Status: DISCONTINUED | OUTPATIENT
Start: 2024-08-04 | End: 2024-08-05

## 2024-08-04 RX ORDER — PANTOPRAZOLE SODIUM 20 MG/1
40 TABLET, DELAYED RELEASE ORAL
Refills: 0 | Status: DISCONTINUED | OUTPATIENT
Start: 2024-08-04 | End: 2024-08-05

## 2024-08-04 RX ORDER — LORAZEPAM 1 MG/1
2 TABLET ORAL ONCE
Refills: 0 | Status: DISCONTINUED | OUTPATIENT
Start: 2024-08-04 | End: 2024-08-04

## 2024-08-04 RX ORDER — OMEPRAZOLE 100 %
1 POWDER (GRAM) MISCELLANEOUS
Refills: 0 | DISCHARGE

## 2024-08-04 RX ORDER — MELATONIN 3 MG
3 TABLET ORAL AT BEDTIME
Refills: 0 | Status: DISCONTINUED | OUTPATIENT
Start: 2024-08-04 | End: 2024-08-05

## 2024-08-04 RX ORDER — BACTERIOSTATIC SODIUM CHLORIDE 0.9 %
1000 VIAL (ML) INJECTION ONCE
Refills: 0 | Status: COMPLETED | OUTPATIENT
Start: 2024-08-04 | End: 2024-08-04

## 2024-08-04 RX ORDER — CYANOCOBALAMIN/FOLIC AC/VIT B6 2-2.5-25MG
1 TABLET ORAL DAILY
Refills: 0 | Status: DISCONTINUED | OUTPATIENT
Start: 2024-08-04 | End: 2024-08-05

## 2024-08-04 RX ORDER — MAGNESIUM, ALUMINUM HYDROXIDE 200-225/5
30 SUSPENSION, ORAL (FINAL DOSE FORM) ORAL EVERY 4 HOURS
Refills: 0 | Status: DISCONTINUED | OUTPATIENT
Start: 2024-08-04 | End: 2024-08-05

## 2024-08-04 RX ORDER — GINGER ROOT/GINGER ROOT EXT 262.5 MG
100 CAPSULE ORAL DAILY
Refills: 0 | Status: DISCONTINUED | OUTPATIENT
Start: 2024-08-04 | End: 2024-08-05

## 2024-08-04 RX ORDER — POTASSIUM CHLORIDE 1500 MG/1
20 TABLET, EXTENDED RELEASE ORAL ONCE
Refills: 0 | Status: COMPLETED | OUTPATIENT
Start: 2024-08-04 | End: 2024-08-04

## 2024-08-04 RX ORDER — CHLORDIAZEPOXIDE HCL 10 MG
50 CAPSULE ORAL ONCE
Refills: 0 | Status: DISCONTINUED | OUTPATIENT
Start: 2024-08-04 | End: 2024-08-04

## 2024-08-04 RX ORDER — LORAZEPAM 1 MG/1
2 TABLET ORAL
Refills: 0 | Status: DISCONTINUED | OUTPATIENT
Start: 2024-08-04 | End: 2024-08-04

## 2024-08-04 RX ORDER — ACETAMINOPHEN 500 MG
975 TABLET ORAL ONCE
Refills: 0 | Status: COMPLETED | OUTPATIENT
Start: 2024-08-04 | End: 2024-08-04

## 2024-08-04 RX ORDER — MULTIVITAMIN/IRON/FOLIC ACID 18MG-0.4MG
1 TABLET ORAL DAILY
Refills: 0 | Status: DISCONTINUED | OUTPATIENT
Start: 2024-08-04 | End: 2024-08-05

## 2024-08-04 RX ADMIN — Medication 1000 MILLILITER(S): at 19:00

## 2024-08-04 RX ADMIN — Medication 1000 MILLILITER(S): at 22:04

## 2024-08-04 RX ADMIN — POTASSIUM CHLORIDE 20 MILLIEQUIVALENT(S): 1500 TABLET, EXTENDED RELEASE ORAL at 18:42

## 2024-08-04 RX ADMIN — Medication 1000 MILLILITER(S): at 16:46

## 2024-08-04 RX ADMIN — Medication 1000 MILLILITER(S): at 17:46

## 2024-08-04 RX ADMIN — Medication 1000 MILLILITER(S): at 16:51

## 2024-08-04 RX ADMIN — LORAZEPAM 1 MILLIGRAM(S): 1 TABLET ORAL at 21:59

## 2024-08-04 RX ADMIN — Medication 1000 MILLILITER(S): at 18:00

## 2024-08-04 RX ADMIN — Medication 1000 MILLILITER(S): at 23:04

## 2024-08-04 RX ADMIN — Medication 975 MILLIGRAM(S): at 19:09

## 2024-08-04 RX ADMIN — Medication 975 MILLIGRAM(S): at 18:43

## 2024-08-04 RX ADMIN — Medication 50 MILLIGRAM(S): at 21:58

## 2024-08-04 RX ADMIN — Medication 100 MILLIGRAM(S): at 22:01

## 2024-08-04 RX ADMIN — LORAZEPAM 2 MILLIGRAM(S): 1 TABLET ORAL at 23:52
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
## 2024-08-04 NOTE — H&P ADULT - NSHPREVIEWOFSYSTEMS_GEN_ALL_CORE
CONSTITUTIONAL: + fatigue x1mo, denies fever, chills, weakness  HEENT: +intermittent blurred vision(currently asymptomatic), denies sore throat  SKIN: denies new lesions, rash  CARDIOVASCULAR: denies chest pain, chest pressure, palpitations  RESPIRATORY: denies shortness of breath, sputum production  GASTROINTESTINAL: denies nausea, vomiting, diarrhea, abdominal pain  GENITOURINARY: denies dysuria, discharge  NEUROLOGICAL: +headache (currently asymptomatic), denies numbness, focal weakness  MUSCULOSKELETAL: denies new joint pain, muscle aches  HEMATOLOGIC: denies gross bleeding, bruising  LYMPHATICS: denies enlarged lymph nodes, extremity swelling

## 2024-08-04 NOTE — ED PROVIDER NOTE - PROGRESS NOTE DETAILS
Patient stable.  Initial lactate elevated.  Repeat lactate slightly more elevated after 3 L of fluids infused.  Remains tachycardic around 120.  CT of head, neck, and maxillofacial bones unremarkable.  Patient febrile on arrival, uncertain etiology.  No chest pain, shortness of breath, abdominal pain, urinary symptoms.  Due to fever and elevated lactate, Rocephin given in emergency room.  Continues to remain tachycardic, suspect withdrawal symptoms.  Patient given Ativan and oral Librium.  Troponin slightly elevated.  Will continue to trend and monitor.  Spoke with attending hospitalist, Dr. Clay. Kindly accepts patient for admission.  Will order CTA of chest and abdomen to evaluate for PE, infection, or intra-abdominal pathology
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
## 2024-08-04 NOTE — H&P ADULT - PROBLEM SELECTOR PLAN 2
Endorses chronic alcohol use ~10 drinks per week last drink this AM @ 11:30. Appears anxious requiring anxiolytics in ED. Endorses chewing tobacco use.  CIWA  2 on admission    - s/p  Librium 50mg PO x1, Ativan 1mg IVP x1 in ED  - Seizure protocol  - Nicotine patch PRN  - Ativan 2mg q6 PRN for anxiety Endorses chronic alcohol use ~10 drinks per week last drink this AM @ 11:30. Appears anxious requiring anxiolytics in ED. Endorses chewing tobacco use.  CIWA  2 on admission    - s/p  Librium 50mg PO x1, Ativan 1mg IVP x1 in ED  - CIWA protocol; symptom triggered Ativan 2mg PO  - Seizure protocol  - Nicotine patch PRN  - Ativan 2mg q6 PRN for anxiety  - SBIRT consult; appreciate recs EKG: Sinus tachycardia @111 w/ possible L atrial enlargement L axis deviation L ventricular hypertrophy Qtc 484 on presentation    - Tele monitoring  - Trop 96.8 EKG with sinus tach @111 likely demand ischemia; asymptomatic  - trend trops to peak  - f/u echo, lipid panel, A1c  - cardio consult ([facility]); appreciate recs EKG: Sinus tachycardia @111 w/ possible L atrial enlargement L axis deviation L ventricular hypertrophy Qtc 484 on presentation possibly in setting of chronic alcohol use     - Tele monitoring  - Trop 96.8 EKG with sinus tach @111 likely demand ischemia; asymptomatic  - trend trops to peak  - f/u echo, lipid panel, A1c  - cardio consult ([facility]); appreciate recs

## 2024-08-04 NOTE — ED ADULT NURSE NOTE - NSFALLUNIVINTERV_ED_ALL_ED
Bed/Stretcher in lowest position, wheels locked, appropriate side rails in place/Call bell, personal items and telephone in reach/Instruct patient to call for assistance before getting out of bed/chair/stretcher/Non-slip footwear applied when patient is off stretcher/[place] to call system/Physically safe environment - no spills, clutter or unnecessary equipment/Purposeful proactive rounding/Room/bathroom lighting operational, light cord in reach
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
## 2024-08-04 NOTE — H&P ADULT - PROBLEM SELECTOR PLAN 6
Chronic on home omeprazole    - continue home meds Chronic on home omeprazole. Grande's esophagus on EGD (March-April) on PPI BID.     - continue home meds

## 2024-08-04 NOTE — H&P ADULT - PROBLEM SELECTOR PLAN 4
Imaging:  CT HEAD:  1.  No evidence of acute intracranial hemorrhage or midline shift.  2.  Chronic small vessel disease.  CT FACIAL BONES:  1.  No evidence of acute fracture of the facial bones.  CT CERVICAL SPINE:  1.  No evidence of acute osseous fracture or doug dislocation.  2.  Mild degenerative changes of the cervical spine.    - Follows with Dr. Marino with recent EGD in March-April per pt. Grande's esophagus on PPI BID. Evidence of portal hypertension and chronic cirrhosis on CTAP.     - f/u coags  - Trend LFTs  - avoid hepatotoxic agents  - f/u outpatient for further workup
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
## 2024-08-04 NOTE — H&P ADULT - PROBLEM SELECTOR PLAN 1
Meets SIRs criteria on admission Tmax 101, . Unclear source, UA with few bacteria also squamous cells (likely contaminant), and proteinuria, no leukesterase or nitrites.   Imaging: No pulmonary embolism though evaluation of subsegmental branches is somewhat limited secondary to bolus timing. No consolidation or pleural effusion. Liver cirrhotic morphology with findings of portal hypertension. No ascites. Mild bladder wall thickening may reflect underdistention and/or cystitis. Correlate with urinalysis and symptomatology.      - s/p acetaminophen 975mg PO x1, ceftriaxone 1000mg IVPB x1, KCl 20meq x1, 1L 0.9% NS bolus x4 in ED  - UA neg, RVP neg  - Lactate 2.2 --> 2.4  - F/u UCx, BCx x2, repeat Lactate  - Endorses chronic alcohol use ~10 drinks per week last drink this AM @ 11:30. Appears anxious requiring anxiolytics in ED. Endorses chewing tobacco use. Lactate likely elevated 2/2 ETOH level   [facility]  16 on admission    - s/p  Librium 50mg PO x1, Ativan 1mg IVP x1 in ED  - [facility] protocol  - Seizure protocol  - Nicotine patch PRN  - Ativan taper per [facility] protocol  - Monitor for signs and symptoms of DT  - SBIRT consult; appreciate recs    #electrolyte abnormaliities  Hypokalemia  - s/p KCl 20 meq x1  - give 1x KCl 20 meq x1  - CTM on AM labs Endorses chronic alcohol use ~10 drinks per week last drink this AM @ 11:30. Appears anxious requiring anxiolytics in ED. Endorses chewing tobacco use. Lactate likely elevated 2/2 ETOH level   [facility]  16 on admission    - s/p  Librium 50mg PO x1, Ativan 1mg IVP x1 in ED  - [facility] protocol  - Seizure protocol  - Nicotine patch PRN  - Ativan taper per [facility] protocol  - Monitor for signs and symptoms of DT  - f/u urine tox screen  - SBIRT consult; appreciate recs    #electrolyte abnormaliities  Hypokalemia  - s/p KCl 20 meq x1  - give 1x KCl 20 meq x1  - CTM on AM labs

## 2024-08-04 NOTE — H&P ADULT - NSHPPHYSICALEXAM_GEN_ALL_CORE
T(C): 36.7 (08-04-24 @ 23:19), Max: 38.3 (08-04-24 @ 15:26)  HR: 110 (08-04-24 @ 23:19) (109 - 122)  BP: 166/95 (08-04-24 @ 23:19) (139/65 - 166/95)  RR: 20 (08-04-24 @ 23:19) (16 - 20)  SpO2: 95% (08-04-24 @ 23:19) (95% - 99%)    GENERAL: +anxious appearing, no acute distress, appropriate  EYES: +orbital ecchymoses R>L; sclera clear, no exudates  ENMT: oropharynx clear without erythema, no exudates, moist mucous membranes  NECK: supple, soft, no thyromegaly noted  LUNGS: good air entry bilaterally, clear to auscultation, symmetric breath sounds, no wheezing or rhonchi appreciated  HEART: soft S1/S2, regular rate and rhythm, no murmurs noted, no lower extremity edema  GASTROINTESTINAL: abdomen is soft, nontender, nondistended, normoactive bowel sounds, no palpable masses  INTEGUMENT: +L hand digit erythema and mild swelling; +contusion/swelling to back of the head; good skin turgor  MUSCULOSKELETAL: no clubbing or cyanosis, no obvious deformity  NEUROLOGIC: awake, alert, oriented x3, good muscle tone in 4 extremities, no obvious sensory deficits

## 2024-08-04 NOTE — H&P ADULT - PROBLEM SELECTOR PLAN 5
Chronic on home amlodipine     - continue home meds with hold parameters Chronic on home amlodipine     - uncontrolled HTN jayden in setting of alcohol withdrawal  - on ativan taper  - continue home meds with hold parameters
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
## 2024-08-04 NOTE — ED PROVIDER NOTE - MUSCULOSKELETAL, MLM
Spine appears normal, range of motion is not limited, no vert tenderness. no muscle or joint tenderness

## 2024-08-04 NOTE — H&P ADULT - PROBLEM SELECTOR PLAN 7
DVT:  Diet: Dash/TLC  Dispo: Pending clinical course s/p Fall   Imaging:  CT HEAD:  1.  No evidence of acute intracranial hemorrhage or midline shift.  2.  Chronic small vessel disease.  CT FACIAL BONES:  1.  No evidence of acute fracture of the facial bones.  CT CERVICAL SPINE:  1.  No evidence of acute osseous fracture or doug dislocation.  2.  Mild degenerative changes of the cervical spine.    -

## 2024-08-04 NOTE — ED ADULT NURSE REASSESSMENT NOTE - NS ED NURSE REASSESS COMMENT FT1
pt. send to Ct scan, stable upon transfer.
received pt. from previous RN - Fortunato MARAVILLA, pt. is on bed, attached to monitor, for blood collection of lactate and Troponin as endorsed, alert and oriented, safety maintained and applied.
Medical Necessity Statement: Based on my medical judgement, Mohs surgery is the most appropriate treatment for this cancer compared to other treatments.

## 2024-08-04 NOTE — ED PROVIDER NOTE - NEURO NEGATIVE STATEMENT, MLM
no loss of consciousness, occ unsteady gait. intermittent headache, no current dizziness of confusion
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
## 2024-08-04 NOTE — ED ADULT NURSE NOTE - CAS EDN DISCHARGE ASSESSMENT
From: Jyoti Hamilton  To: Jerica Wagoner  Sent: 5/13/2022 5:36 PM CDT  Subject: Missed appointment     I seem to be missing appointments. Can I reschedule? Somehow I thought it was Monday.    Alert and oriented to person, place and time/Awake

## 2024-08-05 VITALS
TEMPERATURE: 98 F | RESPIRATION RATE: 18 BRPM | DIASTOLIC BLOOD PRESSURE: 89 MMHG | HEART RATE: 116 BPM | OXYGEN SATURATION: 92 % | SYSTOLIC BLOOD PRESSURE: 166 MMHG

## 2024-08-05 DIAGNOSIS — R79.89 OTHER SPECIFIED ABNORMAL FINDINGS OF BLOOD CHEMISTRY: ICD-10-CM

## 2024-08-05 DIAGNOSIS — R50.9 FEVER, UNSPECIFIED: ICD-10-CM

## 2024-08-05 DIAGNOSIS — K74.60 UNSPECIFIED CIRRHOSIS OF LIVER: ICD-10-CM

## 2024-08-05 LAB
A1C WITH ESTIMATED AVERAGE GLUCOSE RESULT: 5.6 % — SIGNIFICANT CHANGE UP (ref 4–5.6)
ALBUMIN SERPL ELPH-MCNC: 3.4 G/DL — SIGNIFICANT CHANGE UP (ref 3.3–5)
ALBUMIN SERPL ELPH-MCNC: 3.6 G/DL — SIGNIFICANT CHANGE UP (ref 3.3–5)
ALP SERPL-CCNC: 75 U/L — SIGNIFICANT CHANGE UP (ref 40–120)
ALP SERPL-CCNC: 76 U/L — SIGNIFICANT CHANGE UP (ref 40–120)
ALT FLD-CCNC: 41 U/L — SIGNIFICANT CHANGE UP (ref 12–78)
ALT FLD-CCNC: 43 U/L — SIGNIFICANT CHANGE UP (ref 12–78)
ANION GAP SERPL CALC-SCNC: 11 MMOL/L — SIGNIFICANT CHANGE UP (ref 5–17)
ANION GAP SERPL CALC-SCNC: 12 MMOL/L — SIGNIFICANT CHANGE UP (ref 5–17)
AST SERPL-CCNC: 57 U/L — HIGH (ref 15–37)
AST SERPL-CCNC: 63 U/L — HIGH (ref 15–37)
BASOPHILS # BLD AUTO: 0.03 K/UL — SIGNIFICANT CHANGE UP (ref 0–0.2)
BASOPHILS NFR BLD AUTO: 0.5 % — SIGNIFICANT CHANGE UP (ref 0–2)
BILIRUB DIRECT SERPL-MCNC: 0.3 MG/DL — SIGNIFICANT CHANGE UP (ref 0–0.3)
BILIRUB INDIRECT FLD-MCNC: 0.7 MG/DL — SIGNIFICANT CHANGE UP (ref 0.2–1)
BILIRUB SERPL-MCNC: 1 MG/DL — SIGNIFICANT CHANGE UP (ref 0.2–1.2)
BILIRUB SERPL-MCNC: 1.8 MG/DL — HIGH (ref 0.2–1.2)
BUN SERPL-MCNC: 6 MG/DL — LOW (ref 7–23)
BUN SERPL-MCNC: 8 MG/DL — SIGNIFICANT CHANGE UP (ref 7–23)
CALCIUM SERPL-MCNC: 8.8 MG/DL — SIGNIFICANT CHANGE UP (ref 8.5–10.1)
CALCIUM SERPL-MCNC: 9.2 MG/DL — SIGNIFICANT CHANGE UP (ref 8.5–10.1)
CHLORIDE SERPL-SCNC: 106 MMOL/L — SIGNIFICANT CHANGE UP (ref 96–108)
CHLORIDE SERPL-SCNC: 107 MMOL/L — SIGNIFICANT CHANGE UP (ref 96–108)
CHOLEST SERPL-MCNC: 190 MG/DL — SIGNIFICANT CHANGE UP
CO2 SERPL-SCNC: 23 MMOL/L — SIGNIFICANT CHANGE UP (ref 22–31)
CO2 SERPL-SCNC: 25 MMOL/L — SIGNIFICANT CHANGE UP (ref 22–31)
CREAT SERPL-MCNC: 0.69 MG/DL — SIGNIFICANT CHANGE UP (ref 0.5–1.3)
CREAT SERPL-MCNC: 0.7 MG/DL — SIGNIFICANT CHANGE UP (ref 0.5–1.3)
EGFR: 108 ML/MIN/1.73M2 — SIGNIFICANT CHANGE UP
EGFR: 109 ML/MIN/1.73M2 — SIGNIFICANT CHANGE UP
EOSINOPHIL # BLD AUTO: 0.07 K/UL — SIGNIFICANT CHANGE UP (ref 0–0.5)
EOSINOPHIL NFR BLD AUTO: 1.3 % — SIGNIFICANT CHANGE UP (ref 0–6)
ESTIMATED AVERAGE GLUCOSE: 114 MG/DL — SIGNIFICANT CHANGE UP (ref 68–114)
GLUCOSE SERPL-MCNC: 101 MG/DL — HIGH (ref 70–99)
GLUCOSE SERPL-MCNC: 123 MG/DL — HIGH (ref 70–99)
HCT VFR BLD CALC: 43.8 % — SIGNIFICANT CHANGE UP (ref 39–50)
HDLC SERPL-MCNC: 70 MG/DL — SIGNIFICANT CHANGE UP
HGB BLD-MCNC: 14.9 G/DL — SIGNIFICANT CHANGE UP (ref 13–17)
IMM GRANULOCYTES NFR BLD AUTO: 0.4 % — SIGNIFICANT CHANGE UP (ref 0–0.9)
LACTATE SERPL-SCNC: 2.2 MMOL/L — HIGH (ref 0.7–2)
LIPID PNL WITH DIRECT LDL SERPL: 103 MG/DL — HIGH
LYMPHOCYTES # BLD AUTO: 1.44 K/UL — SIGNIFICANT CHANGE UP (ref 1–3.3)
LYMPHOCYTES # BLD AUTO: 25.7 % — SIGNIFICANT CHANGE UP (ref 13–44)
MAGNESIUM SERPL-MCNC: 1.3 MG/DL — LOW (ref 1.6–2.6)
MAGNESIUM SERPL-MCNC: 1.5 MG/DL — LOW (ref 1.6–2.6)
MCHC RBC-ENTMCNC: 31.6 PG — SIGNIFICANT CHANGE UP (ref 27–34)
MCHC RBC-ENTMCNC: 34 GM/DL — SIGNIFICANT CHANGE UP (ref 32–36)
MCV RBC AUTO: 92.8 FL — SIGNIFICANT CHANGE UP (ref 80–100)
MONOCYTES # BLD AUTO: 0.56 K/UL — SIGNIFICANT CHANGE UP (ref 0–0.9)
MONOCYTES NFR BLD AUTO: 10 % — SIGNIFICANT CHANGE UP (ref 2–14)
NEUTROPHILS # BLD AUTO: 3.48 K/UL — SIGNIFICANT CHANGE UP (ref 1.8–7.4)
NEUTROPHILS NFR BLD AUTO: 62.1 % — SIGNIFICANT CHANGE UP (ref 43–77)
NON HDL CHOLESTEROL: 120 MG/DL — SIGNIFICANT CHANGE UP
NRBC # BLD: 0 /100 WBCS — SIGNIFICANT CHANGE UP (ref 0–0)
PHOSPHATE SERPL-MCNC: 2.7 MG/DL — SIGNIFICANT CHANGE UP (ref 2.5–4.5)
PHOSPHATE SERPL-MCNC: 3.1 MG/DL — SIGNIFICANT CHANGE UP (ref 2.5–4.5)
PLATELET # BLD AUTO: 121 K/UL — LOW (ref 150–400)
POTASSIUM SERPL-MCNC: 3.2 MMOL/L — LOW (ref 3.5–5.3)
POTASSIUM SERPL-MCNC: 4 MMOL/L — SIGNIFICANT CHANGE UP (ref 3.5–5.3)
POTASSIUM SERPL-SCNC: 3.2 MMOL/L — LOW (ref 3.5–5.3)
POTASSIUM SERPL-SCNC: 4 MMOL/L — SIGNIFICANT CHANGE UP (ref 3.5–5.3)
PROT SERPL-MCNC: 7.2 G/DL — SIGNIFICANT CHANGE UP (ref 6–8.3)
PROT SERPL-MCNC: 7.2 G/DL — SIGNIFICANT CHANGE UP (ref 6–8.3)
RBC # BLD: 4.72 M/UL — SIGNIFICANT CHANGE UP (ref 4.2–5.8)
RBC # FLD: 13.5 % — SIGNIFICANT CHANGE UP (ref 10.3–14.5)
SODIUM SERPL-SCNC: 142 MMOL/L — SIGNIFICANT CHANGE UP (ref 135–145)
SODIUM SERPL-SCNC: 142 MMOL/L — SIGNIFICANT CHANGE UP (ref 135–145)
TRIGL SERPL-MCNC: 96 MG/DL — SIGNIFICANT CHANGE UP
TROPONIN I, HIGH SENSITIVITY RESULT: 123.4 NG/L — HIGH
WBC # BLD: 5.6 K/UL — SIGNIFICANT CHANGE UP (ref 3.8–10.5)
WBC # FLD AUTO: 5.6 K/UL — SIGNIFICANT CHANGE UP (ref 3.8–10.5)

## 2024-08-05 PROCEDURE — 84484 ASSAY OF TROPONIN QUANT: CPT

## 2024-08-05 PROCEDURE — 93005 ELECTROCARDIOGRAM TRACING: CPT

## 2024-08-05 PROCEDURE — 85730 THROMBOPLASTIN TIME PARTIAL: CPT

## 2024-08-05 PROCEDURE — 97161 PT EVAL LOW COMPLEX 20 MIN: CPT

## 2024-08-05 PROCEDURE — 99232 SBSQ HOSP IP/OBS MODERATE 35: CPT

## 2024-08-05 PROCEDURE — 87040 BLOOD CULTURE FOR BACTERIA: CPT

## 2024-08-05 PROCEDURE — 87637 SARSCOV2&INF A&B&RSV AMP PRB: CPT

## 2024-08-05 PROCEDURE — 81001 URINALYSIS AUTO W/SCOPE: CPT

## 2024-08-05 PROCEDURE — 71275 CT ANGIOGRAPHY CHEST: CPT | Mod: MC

## 2024-08-05 PROCEDURE — 99285 EMERGENCY DEPT VISIT HI MDM: CPT

## 2024-08-05 PROCEDURE — 72125 CT NECK SPINE W/O DYE: CPT | Mod: MC

## 2024-08-05 PROCEDURE — 70450 CT HEAD/BRAIN W/O DYE: CPT | Mod: MC

## 2024-08-05 PROCEDURE — 70450 CT HEAD/BRAIN W/O DYE: CPT | Mod: 26

## 2024-08-05 PROCEDURE — 84100 ASSAY OF PHOSPHORUS: CPT

## 2024-08-05 PROCEDURE — 80307 DRUG TEST PRSMV CHEM ANLYZR: CPT

## 2024-08-05 PROCEDURE — 83735 ASSAY OF MAGNESIUM: CPT

## 2024-08-05 PROCEDURE — 96374 THER/PROPH/DIAG INJ IV PUSH: CPT

## 2024-08-05 PROCEDURE — 80076 HEPATIC FUNCTION PANEL: CPT

## 2024-08-05 PROCEDURE — 70486 CT MAXILLOFACIAL W/O DYE: CPT | Mod: MC

## 2024-08-05 PROCEDURE — 85025 COMPLETE CBC W/AUTO DIFF WBC: CPT

## 2024-08-05 PROCEDURE — 85610 PROTHROMBIN TIME: CPT

## 2024-08-05 PROCEDURE — 36415 COLL VENOUS BLD VENIPUNCTURE: CPT

## 2024-08-05 PROCEDURE — 96375 TX/PRO/DX INJ NEW DRUG ADDON: CPT

## 2024-08-05 PROCEDURE — 96361 HYDRATE IV INFUSION ADD-ON: CPT

## 2024-08-05 PROCEDURE — 83036 HEMOGLOBIN GLYCOSYLATED A1C: CPT

## 2024-08-05 PROCEDURE — 80048 BASIC METABOLIC PNL TOTAL CA: CPT

## 2024-08-05 PROCEDURE — 99222 1ST HOSP IP/OBS MODERATE 55: CPT

## 2024-08-05 PROCEDURE — 80061 LIPID PANEL: CPT

## 2024-08-05 PROCEDURE — 74177 CT ABD & PELVIS W/CONTRAST: CPT | Mod: MC

## 2024-08-05 PROCEDURE — 83605 ASSAY OF LACTIC ACID: CPT

## 2024-08-05 PROCEDURE — 80053 COMPREHEN METABOLIC PANEL: CPT

## 2024-08-05 RX ORDER — LORAZEPAM 1 MG/1
2 TABLET ORAL EVERY 4 HOURS
Refills: 0 | Status: COMPLETED | OUTPATIENT
Start: 2024-08-05 | End: 2024-08-05

## 2024-08-05 RX ORDER — LORAZEPAM 1 MG/1
0.5 TABLET ORAL EVERY 12 HOURS
Refills: 0 | Status: CANCELLED | OUTPATIENT
Start: 2024-08-09 | End: 2024-08-05

## 2024-08-05 RX ORDER — LORAZEPAM 1 MG/1
0.5 TABLET ORAL EVERY 4 HOURS
Refills: 0 | Status: CANCELLED | OUTPATIENT
Start: 2024-08-08 | End: 2024-08-05

## 2024-08-05 RX ORDER — AMLODIPINE BESYLATE 2.5 MG/1
5 TABLET ORAL DAILY
Refills: 0 | Status: DISCONTINUED | OUTPATIENT
Start: 2024-08-05 | End: 2024-08-05

## 2024-08-05 RX ORDER — MAGNESIUM SULFATE 500 MG/ML
1 VIAL (ML) INJECTION ONCE
Refills: 0 | Status: COMPLETED | OUTPATIENT
Start: 2024-08-05 | End: 2024-08-05

## 2024-08-05 RX ORDER — LORAZEPAM 1 MG/1
TABLET ORAL
Refills: 0 | Status: DISCONTINUED | OUTPATIENT
Start: 2024-08-05 | End: 2024-08-05

## 2024-08-05 RX ORDER — MAGNESIUM SULFATE 500 MG/ML
2 VIAL (ML) INJECTION ONCE
Refills: 0 | Status: COMPLETED | OUTPATIENT
Start: 2024-08-05 | End: 2024-08-05

## 2024-08-05 RX ORDER — AMLODIPINE BESYLATE 2.5 MG/1
1 TABLET ORAL
Qty: 0 | Refills: 0 | DISCHARGE
Start: 2024-08-05

## 2024-08-05 RX ORDER — NICOTINE 21 MG/24HR
1 PATCH, TRANSDERMAL 24 HOURS TRANSDERMAL DAILY
Refills: 0 | Status: DISCONTINUED | OUTPATIENT
Start: 2024-08-05 | End: 2024-08-05

## 2024-08-05 RX ORDER — LORAZEPAM 1 MG/1
1 TABLET ORAL EVERY 4 HOURS
Refills: 0 | Status: CANCELLED | OUTPATIENT
Start: 2024-08-07 | End: 2024-08-05

## 2024-08-05 RX ORDER — LORAZEPAM 1 MG/1
1.5 TABLET ORAL EVERY 4 HOURS
Refills: 0 | Status: DISCONTINUED | OUTPATIENT
Start: 2024-08-06 | End: 2024-08-05

## 2024-08-05 RX ORDER — POTASSIUM CHLORIDE 1500 MG/1
40 TABLET, EXTENDED RELEASE ORAL EVERY 4 HOURS
Refills: 0 | Status: DISCONTINUED | OUTPATIENT
Start: 2024-08-05 | End: 2024-08-05

## 2024-08-05 RX ADMIN — LORAZEPAM 2 MILLIGRAM(S): 1 TABLET ORAL at 02:41

## 2024-08-05 RX ADMIN — Medication 1 TABLET(S): at 13:09

## 2024-08-05 RX ADMIN — Medication 100 MILLIGRAM(S): at 13:09

## 2024-08-05 RX ADMIN — PANTOPRAZOLE SODIUM 40 MILLIGRAM(S): 20 TABLET, DELAYED RELEASE ORAL at 06:11

## 2024-08-05 RX ADMIN — LORAZEPAM 2 MILLIGRAM(S): 1 TABLET ORAL at 10:36

## 2024-08-05 RX ADMIN — Medication 1 MILLIGRAM(S): at 13:09

## 2024-08-05 RX ADMIN — LORAZEPAM 2 MILLIGRAM(S): 1 TABLET ORAL at 06:11

## 2024-08-05 RX ADMIN — Medication 25 GRAM(S): at 10:36

## 2024-08-05 RX ADMIN — Medication 100 GRAM(S): at 14:31

## 2024-08-05 RX ADMIN — POTASSIUM CHLORIDE 40 MILLIEQUIVALENT(S): 1500 TABLET, EXTENDED RELEASE ORAL at 14:31

## 2024-08-05 RX ADMIN — LORAZEPAM 2 MILLIGRAM(S): 1 TABLET ORAL at 13:08

## 2024-08-05 RX ADMIN — Medication 100 MILLIGRAM(S): at 14:31

## 2024-08-05 RX ADMIN — AMLODIPINE BESYLATE 5 MILLIGRAM(S): 2.5 TABLET ORAL at 06:10

## 2024-08-05 NOTE — CONSULT NOTE ADULT - ATTENDING COMMENTS
55 yo M with PMH of HTN, GERD, and alcohol use disorder (last drink this AM at 11:30) admitted for impending DTs. Called to evaluate for elevated troponin.    - elevated troponin noted, though no suggestive of acs  - no chest pain or dyspnea, nor trauma to the chest  - would trend troponin until peak  - add check ck and ckmb  - echocardiogram to confirm normal LV function  - no sign of volume overload  - st on telemetry, likely in the setting of dehydration/pain/alcohol use  - monitor for alcohol withdrawal  - bp elevated, and would cont amlodipine  - replete electrolytes. Keep K>4 mg>2  - Other cardiovascular workup will depend on clinical course.  - will follow with you
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
## 2024-08-05 NOTE — PROGRESS NOTE ADULT - PROBLEM SELECTOR PLAN 1
Endorses chronic alcohol use ~10 drinks per week last drink this AM @ 11:30. Appears anxious requiring anxiolytics in ED. Endorses chewing tobacco use. Lactate likely elevated 2/2 ETOH level   CIWA  16 on admission  on librium taper  CIWA is > 4 today  patient ambulating well

## 2024-08-05 NOTE — PHYSICAL THERAPY INITIAL EVALUATION ADULT - LEVEL OF INDEPENDENCE: GAIT, REHAB EVAL
Dr. Nicolas-  Urea 40% ointment is expensive for patient.  Can Urea 40% cream over the counter be offered to the patient.   Thank you.  
Left message on  (sweta) voicemail that urea 40% cream is available over the counter and can be used if urea 40% ointment is not covered. Writer recommended to Sweta to return call if necessary.  
Patient's  Sweta called and said the RX for urea (CARMOL) 40 % ointment is not covered by the patient's insurance. Sweta said that the pharmacy has been trying to reach Dr Nicolas regarding a substitution. Pharmacy verified.   
Yes, she can use the over the counter urea. Please inform patient.   
independent

## 2024-08-05 NOTE — DISCHARGE NOTE NURSING/CASE MANAGEMENT/SOCIAL WORK - PATIENT PORTAL LINK FT
You can access the FollowMyHealth Patient Portal offered by [facility] by registering at the following website: [email or web address]. By joining [email or web address]’s FollowMyHealth portal, you will also be able to view your health information using other applications (apps) compatible with our system.
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
## 2024-08-05 NOTE — PROGRESS NOTE ADULT - NSPROGADDITIONALINFOA_GEN_ALL_CORE
patient does not want to stay in hospital overnight. CIWA very low  dw patient will reassess later today and decide on dc home
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
## 2024-08-05 NOTE — PROGRESS NOTE ADULT - PROBLEM SELECTOR PLAN 6
Chronic on home omeprazole. Grande's esophagus on EGD (March-April) on PPI BID.     - continue home meds

## 2024-08-05 NOTE — DISCHARGE NOTE NURSING/CASE MANAGEMENT/SOCIAL WORK - NSDCPEFALRISK_GEN_ALL_CORE
For information on Fall & Injury Prevention, visit: [email or web address] OR  [email or web address] OR  https://www.cdc.gov/steadi/patient.html

## 2024-08-05 NOTE — PATIENT PROFILE ADULT - FUNCTIONAL ASSESSMENT - BASIC MOBILITY 6.
4-calculated by average/Not able to assess (calculate score using [facility] averaging method)

## 2024-08-05 NOTE — PROGRESS NOTE ADULT - PROBLEM SELECTOR PLAN 2
EKG: Sinus tachycardia @111 w/ possible L atrial enlargement L axis deviation L ventricular hypertrophy Qtc 484 on presentation possibly in setting of chronic alcohol use     - Tele monitoring  - Trop 96.8 EKG with sinus tach @111 likely demand ischemia; asymptomatic  - trend trops to peak  - f/u echo, lipid panel, A1c  - Cards consulted

## 2024-08-05 NOTE — PATIENT PROFILE ADULT - FALL HARM RISK - HARM RISK INTERVENTIONS
Communicate Risk of Fall with Harm to all staff/Monitor for mental status changes/Monitor gait and stability/Reinforce activity limits and safety measures with patient and family/Tailored Fall Risk Interventions/Use of alarms - bed, chair and/or voice tab/Visual Cue: Yellow wristband and red socks/Bed in lowest position, wheels locked, appropriate side rails in place/Call bell, personal items and telephone in reach/Instruct patient to call for assistance before getting out of bed or chair/Non-slip footwear when patient is out of bed/[place] to call system/Physically safe environment - no spills, clutter or unnecessary equipment/Purposeful Proactive Rounding/Room/bathroom lighting operational, light cord in reach

## 2024-08-05 NOTE — SBIRT NOTE ADULT - NSSBIRTBRIEFINTDET_GEN_A_CORE
Pt educated about risks of drinking and recently relapsed after being sober since aug 2023. Pt declined resources at this time as he goes to  and has a sponsor.

## 2024-08-05 NOTE — PROGRESS NOTE ADULT - PROBLEM SELECTOR PLAN 3
Meets SIRs criteria on admission Tmax 101, . Unclear source, UA with few bacteria also squamous cells (likely   CT imaging does not suggest evidence of Infection nor PE (? developing cystitis  - UA neg, RVP neg  - Lactate 2.2 --> 2.4  plan  followup blood cx  start empiric rocephin

## 2024-08-05 NOTE — CARE COORDINATION ASSESSMENT. - OTHER PERTINENT REFERRAL INFORMATION
Sw met w/ Pt at bedside. Pt is A & O x4. Sw introduced self and role to Pt. Pt lives w/ Spouse in a private home w 3 SANYA from the front and back and 12 steps to basement.  The pt was Indep at home PTA w/ all ADLS. Pt has no hx of DME, HC and SARAH. Pt has been sober since Aug 2023 and had a relapse this past week due to stress at home as both of his children are leaving for college which he stated is the main cause. The PT stated that he has AA and sponsor support and declined additional resources at this time. PCP  Dolly Wolfe [phone],  Shereen: BRENNA [phone]

## 2024-08-05 NOTE — CARE COORDINATION ASSESSMENT. - NSPASTMEDSURGHISTORY_GEN_ALL_CORE_FT
PAST MEDICAL & SURGICAL HISTORY:  GERD (gastroesophageal reflux disease)      HTN (hypertension)      History of appendectomy

## 2024-08-05 NOTE — PROGRESS NOTE ADULT - PROBLEM SELECTOR PLAN 4
Follows with Dr. Marino with recent EGD in March-April per pt. Grande's esophagus on PPI BID. Evidence of portal hypertension and chronic cirrhosis on CTAP.  may need to be on low dose aldactone/lasix regime on dc: for now will hold and hydrate
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
## 2024-08-05 NOTE — PROGRESS NOTE ADULT - SUBJECTIVE AND OBJECTIVE BOX
patient seen and examined  patient comfortable  mild withdrawal symptoms  tremors on outstretched hands  ambulating well with steady gait  Mag and k low  Hypertensive (sinus  tachy on tele 110)    on ativan taper  Review of Systems:  General:denies fever chills, headache, weakness  HEENT: denies blurry vision,diffculty swallowing, difficulty hearing, tinnitus  Cardiovascular: denies chest pain  ,palpitations  Pulmonary:denies shortness of breath, cough, wheezing, hemoptysis  Gastrointestinal: denies abdominal pain, constipation, diarrhea,nausea , vomiting, hematochezia  : denies hematuria, dysuria, or incontinence  Neurological: denies weakness, numbness , tingling, dizziness, tremors  MSK: denies muscle pain, difficulty ambulating, swelling, back pain  skin: denies skin rash, itching, burning, or  skin lesions  Psychiatrical: denies mood disturbances, anxierty, feeling depressed, depression , or difficulty sleeping    Objective:  Vitals  T(C): 36.9 (08-05-24 @ 11:58), Max: 38.3 (08-04-24 @ 15:26)  HR: 116 (08-05-24 @ 11:58) (99 - 122)  BP: 166/89 (08-05-24 @ 11:58) (133/88 - 166/95)  RR: 18 (08-05-24 @ 11:58) (16 - 20)  SpO2: 92% (08-05-24 @ 11:58) (92% - 99%)    Physical Exam:  General: comfortable, no acute distress  HEENT: Atraumatic, no LAD, trachea midline, PERRLA  Cardiovascular: normal s1s2, no murmurs, gallops or fricition rubs  Pulmonary: clear to ausculation Bilaterally, no wheezing , rhonchi  Gastrointestinal: soft non tender non distended, no masses felt, no organomegally  Muscloskeletal: no lower extremity edema, intact bilateral lower extremity pulses  Neurological: CN II-12 intact. No focal weakness  Psychiatrical: normal mood, cooperative  SKIN: no rash, lesions or ulcers    Labs:                          14.9   5.60  )-----------( 121      ( 05 Aug 2024 08:12 )             43.8     08-05    142  |  106  |  8   ----------------------------<  123<H>  3.2<L>   |  25  |  0.70    Ca    9.2      05 Aug 2024 08:12  Phos  2.7     08-05  Mg     1.3     08-05    TPro  7.2  /  Alb  3.6  /  TBili  1.8<H>  /  DBili  x   /  AST  57<H>  /  ALT  43  /  AlkPhos  76  08-05    LIVER FUNCTIONS - ( 05 Aug 2024 08:12 )  Alb: 3.6 g/dL / Pro: 7.2 g/dL / ALK PHOS: 76 U/L / ALT: 43 U/L / AST: 57 U/L / GGT: x           PT/INR - ( 04 Aug 2024 16:20 )   PT: 11.4 sec;   INR: 0.97 ratio         PTT - ( 04 Aug 2024 16:20 )  PTT:32.4 sec      Active Medications  MEDICATIONS  (STANDING):  amLODIPine   Tablet 5 milliGRAM(s) Oral daily  folic acid 1 milliGRAM(s) Oral daily  LORazepam     Tablet   Oral   LORazepam     Tablet 2 milliGRAM(s) Oral every 4 hours  magnesium sulfate  IVPB 1 Gram(s) IV Intermittent once  multivitamin 1 Tablet(s) Oral daily  pantoprazole    Tablet 40 milliGRAM(s) Oral before breakfast  potassium chloride    Tablet ER 20 milliEquivalent(s) Oral once  potassium chloride   Powder 40 milliEquivalent(s) Oral every 4 hours  thiamine 100 milliGRAM(s) Oral daily    MEDICATIONS  (PRN):  aluminum hydroxide/magnesium hydroxide/simethicone Suspension 30 milliLiter(s) Oral every 4 hours PRN Dyspepsia  melatonin 3 milliGRAM(s) Oral at bedtime PRN Insomnia  nicotine - 21 mG/24Hr(s) Patch 1 Patch Transdermal daily PRN Nicotine withdrawal

## 2024-08-05 NOTE — CONSULT NOTE ADULT - ASSESSMENT
Assessment	  Pt is a 55 yo M with PMH of HTN, GERD, and alcohol use disorder (last drink this AM at 11:30) admitted for impending DT.      Problem/Plan - 1:  ·  Problem: Alcohol use.   ·  Plan: Endorses chronic alcohol use ~10 drinks per week last drink this AM @ 11:30. Appears anxious requiring anxiolytics in ED. Endorses chewing tobacco use. Lactate likely elevated 2/2 ETOH level   [facility]  16 on admission    - s/p  Librium 50mg PO x1, Ativan 1mg IVP x1 in ED  - [facility] protocol  - Seizure protocol  - Nicotine patch PRN  - Ativan taper per [facility] protocol  - Monitor for signs and symptoms of DT  - f/u urine tox screen  - SBIRT consult; appreciate recs    #electrolyte abnormaliities  Hypokalemia  - s/p KCl 20 meq x1  - give 1x KCl 20 meq x1  - CTM on AM labs.     Problem/Plan - 2:  ·  Problem: Elevated troponin. measured 98 on admission 6/4/24 and 123 on 6/5/24  ·  Plan: EKG: Sinus tachycardia @111 w/ possible L atrial enlargement L axis deviation L ventricular hypertrophy Qtc 484 on presentation possibly in setting of chronic alcohol use     - Tele monitoring  - Trop 96.8 EKG with sinus tach @111 likely demand ischemia; asymptomatic  - trend trops to peak  - f/u echo, lipid panel, A1c  - cardio consult ([facility]); appreciate recs.      #HTN  - BP stable currently at 141/90  - Continue home meds: amlodipine 5mg once per day  - Continue to monitor hemodynamics     - Other cardiovascular workup will depend on clinical course.  - All other workup per primary team.  - Will continue to follow.       Pt is a 53 yo M with PMH of HTN, GERD, and alcohol use disorder (last drink this AM at 11:30) admitted for impending DT. Called to evaluate for elevated troponin    Elevated troponin: 98 on admission 6/4/24 and 123 on 6/5/24  - ekg with sinus tachycardia @111 w/ possible L atrial enlargement L axis deviation L ventricular hypertrophy Qtc 484  - no chest pain or dyspnea, and troponin elevation no suggestive of acs  - would trend troponin until peak  - check ck and ckmb  - echocardiogram to confirm normal LV function  - st on telemetry, likely in the setting of dehydration/pain/alcohol use  - monitor for alcohol withdrawal  - bp elevated, and would cont amlodipine  - replete electrolytes. Keep K>4 mg>2  - Other cardiovascular workup will depend on clinical course.  - will follow with you

## 2024-08-05 NOTE — PATIENT PROFILE ADULT - MST SCORE

## 2024-08-05 NOTE — CARE COORDINATION ASSESSMENT. - NSCAREPROVIDERS_GEN_ALL_CORE_FT
CARE PROVIDERS:  Accepting Physician: Tessa Howard  Administration: Russel Singh  Administration: Aditya Lawrence  Admitting: Tessa Howard  Attending: Srinivas Concepcion  Cardiology Technician: Linnea Jauregui  Case Management: Jasmin Cooley  Consultant: Lilo Gillespie  Consultant: Travis Glez  Consultant: Srinivas Concepcion  Consultant: Manju Olsen  Consultant: Kya Manzano  Covering Team: Tessa Howard  Covering Team: Erwin Bolden  ED ACP: Pinky Solorzano  ED Attending: Farooq Bullock  ED Nurse: Mehdi Wayne  ED Nurse 2: Pao Vasquez  Nurse: Charisse Hawkins  Nurse: Helen Stewart  Nurse: Ermias Nina  Nurse: Bibi Busch  Override: Ermias Nina  PCA/Nursing Assistant: Fermin Guerra  Physical Therapy: Jacobo Horta  Primary Team: Kevin Villareal  Registered Dietitian: Alma Delia Tabor  : Lolita Sams  : Inessa Nieto  Student: Aleksey Benson  Team: [facility], Team

## 2024-10-17 ENCOUNTER — OFFICE (OUTPATIENT)
Dept: URBAN - METROPOLITAN AREA CLINIC 104 | Facility: CLINIC | Age: 54
Setting detail: OPHTHALMOLOGY
End: 2024-10-17
Payer: COMMERCIAL

## 2024-10-17 DIAGNOSIS — H43.812: ICD-10-CM

## 2024-10-17 DIAGNOSIS — H26.492: ICD-10-CM

## 2024-10-17 PROCEDURE — 92014 COMPRE OPH EXAM EST PT 1/>: CPT | Performed by: OPTOMETRIST

## 2024-10-17 PROCEDURE — 92250 FUNDUS PHOTOGRAPHY W/I&R: CPT | Performed by: OPTOMETRIST

## 2024-10-17 ASSESSMENT — KERATOMETRY
OS_K2POWER_DIOPTERS: 45.24
OD_AXISANGLE_DEGREES: 093
OS_AXISANGLE_DEGREES: 101
OS_K1POWER_DIOPTERS: 44.82
OD_K2POWER_DIOPTERS: 45.73
OD_K1POWER_DIOPTERS: 44.76

## 2024-10-17 ASSESSMENT — REFRACTION_AUTOREFRACTION
OD_AXIS: 167
OS_SPHERE: +0.75
OD_SPHERE: +1.25
OS_CYLINDER: -0.25
OS_AXIS: 087
OD_CYLINDER: -0.50

## 2024-10-17 ASSESSMENT — CONFRONTATIONAL VISUAL FIELD TEST (CVF)
OS_FINDINGS: FULL
OD_FINDINGS: FULL

## 2024-10-17 ASSESSMENT — REFRACTION_CURRENTRX
OD_OVR_VA: 20/
OS_OVR_VA: 20/
OD_SPHERE: +1.75
OS_SPHERE: +1.75

## 2024-10-17 ASSESSMENT — VISUAL ACUITY
OD_BCVA: 20/20-1
OS_BCVA: 20/40

## 2024-10-17 ASSESSMENT — TONOMETRY
OS_IOP_MMHG: 18
OD_IOP_MMHG: 18

## 2025-01-02 ENCOUNTER — EMERGENCY (EMERGENCY)
Facility: HOSPITAL | Age: 55
LOS: 1 days | Discharge: LEFT BEFORE DISCHARGE | End: 2025-01-02
Admitting: EMERGENCY MEDICINE
Payer: COMMERCIAL

## 2025-01-02 VITALS
HEART RATE: 114 BPM | SYSTOLIC BLOOD PRESSURE: 167 MMHG | TEMPERATURE: 98 F | RESPIRATION RATE: 16 BRPM | HEIGHT: 69 IN | WEIGHT: 235.01 LBS | DIASTOLIC BLOOD PRESSURE: 117 MMHG | OXYGEN SATURATION: 99 %

## 2025-01-02 DIAGNOSIS — Z90.49 ACQUIRED ABSENCE OF OTHER SPECIFIED PARTS OF DIGESTIVE TRACT: Chronic | ICD-10-CM

## 2025-01-02 PROCEDURE — L9991: CPT

## 2025-01-02 NOTE — ED ADULT NURSE NOTE - NSFALLUNIVINTERV_ED_ALL_ED
Bed/Stretcher in lowest position, wheels locked, appropriate side rails in place/Call bell, personal items and telephone in reach/Instruct patient to call for assistance before getting out of bed/chair/stretcher/Non-slip footwear applied when patient is off stretcher/[place] to call system/Physically safe environment - no spills, clutter or unnecessary equipment/Purposeful proactive rounding/Room/bathroom lighting operational, light cord in reach
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
## 2025-03-13 NOTE — ED PROVIDER NOTE - HIV OFFER
RhoGam 1500 IU injection given. Verbal order for injection from Dr. Townsend. Patient tolerated without incident. See MAR for documentation.    Opt out